# Patient Record
Sex: MALE | Race: BLACK OR AFRICAN AMERICAN | NOT HISPANIC OR LATINO | Employment: OTHER | URBAN - METROPOLITAN AREA
[De-identification: names, ages, dates, MRNs, and addresses within clinical notes are randomized per-mention and may not be internally consistent; named-entity substitution may affect disease eponyms.]

---

## 2024-10-01 ENCOUNTER — APPOINTMENT (OUTPATIENT)
Dept: RADIOLOGY | Facility: HOSPITAL | Age: 69
End: 2024-10-01
Payer: MEDICARE

## 2024-10-01 ENCOUNTER — HOSPITAL ENCOUNTER (OUTPATIENT)
Facility: HOSPITAL | Age: 69
Setting detail: OBSERVATION
LOS: 1 days | Discharge: HOME | End: 2024-10-02
Attending: STUDENT IN AN ORGANIZED HEALTH CARE EDUCATION/TRAINING PROGRAM | Admitting: INTERNAL MEDICINE
Payer: MEDICARE

## 2024-10-01 ENCOUNTER — APPOINTMENT (OUTPATIENT)
Dept: CARDIOLOGY | Facility: HOSPITAL | Age: 69
End: 2024-10-01
Payer: MEDICARE

## 2024-10-01 DIAGNOSIS — R33.8 ACUTE URINARY RETENTION: Primary | ICD-10-CM

## 2024-10-01 LAB
ALBUMIN SERPL BCP-MCNC: 4.3 G/DL (ref 3.4–5)
ALP SERPL-CCNC: 53 U/L (ref 33–136)
ALT SERPL W P-5'-P-CCNC: 11 U/L (ref 10–52)
ANION GAP SERPL CALC-SCNC: 15 MMOL/L (ref 10–20)
APPEARANCE UR: ABNORMAL
AST SERPL W P-5'-P-CCNC: 19 U/L (ref 9–39)
BACTERIA #/AREA URNS AUTO: ABNORMAL /HPF
BASOPHILS # BLD AUTO: 0.04 X10*3/UL (ref 0–0.1)
BASOPHILS NFR BLD AUTO: 0.3 %
BILIRUB SERPL-MCNC: 1 MG/DL (ref 0–1.2)
BILIRUB UR STRIP.AUTO-MCNC: NEGATIVE MG/DL
BUN SERPL-MCNC: 13 MG/DL (ref 6–23)
CALCIUM SERPL-MCNC: 9.1 MG/DL (ref 8.6–10.3)
CHLORIDE SERPL-SCNC: 98 MMOL/L (ref 98–107)
CO2 SERPL-SCNC: 29 MMOL/L (ref 21–32)
COLOR UR: ABNORMAL
CREAT SERPL-MCNC: 1.14 MG/DL (ref 0.5–1.3)
EGFRCR SERPLBLD CKD-EPI 2021: 69 ML/MIN/1.73M*2
EOSINOPHIL # BLD AUTO: 0.01 X10*3/UL (ref 0–0.7)
EOSINOPHIL NFR BLD AUTO: 0.1 %
ERYTHROCYTE [DISTWIDTH] IN BLOOD BY AUTOMATED COUNT: 13.6 % (ref 11.5–14.5)
GLUCOSE BLD MANUAL STRIP-MCNC: 93 MG/DL (ref 74–99)
GLUCOSE BLD MANUAL STRIP-MCNC: 94 MG/DL (ref 74–99)
GLUCOSE SERPL-MCNC: 149 MG/DL (ref 74–99)
GLUCOSE UR STRIP.AUTO-MCNC: NORMAL MG/DL
HCT VFR BLD AUTO: 42.5 % (ref 41–52)
HGB BLD-MCNC: 12.6 G/DL (ref 13.5–17.5)
HOLD SPECIMEN: NORMAL
IMM GRANULOCYTES # BLD AUTO: 0.08 X10*3/UL (ref 0–0.7)
IMM GRANULOCYTES NFR BLD AUTO: 0.5 % (ref 0–0.9)
KETONES UR STRIP.AUTO-MCNC: ABNORMAL MG/DL
LEUKOCYTE ESTERASE UR QL STRIP.AUTO: ABNORMAL
LIPASE SERPL-CCNC: 6 U/L (ref 9–82)
LYMPHOCYTES # BLD AUTO: 0.96 X10*3/UL (ref 1.2–4.8)
LYMPHOCYTES NFR BLD AUTO: 6.4 %
MCH RBC QN AUTO: 26 PG (ref 26–34)
MCHC RBC AUTO-ENTMCNC: 29.6 G/DL (ref 32–36)
MCV RBC AUTO: 88 FL (ref 80–100)
MONOCYTES # BLD AUTO: 0.75 X10*3/UL (ref 0.1–1)
MONOCYTES NFR BLD AUTO: 5 %
NEUTROPHILS # BLD AUTO: 13.1 X10*3/UL (ref 1.2–7.7)
NEUTROPHILS NFR BLD AUTO: 87.7 %
NITRITE UR QL STRIP.AUTO: NEGATIVE
NRBC BLD-RTO: 0 /100 WBCS (ref 0–0)
PH UR STRIP.AUTO: 7 [PH]
PLATELET # BLD AUTO: 221 X10*3/UL (ref 150–450)
POTASSIUM SERPL-SCNC: 3.8 MMOL/L (ref 3.5–5.3)
PROT SERPL-MCNC: 6.8 G/DL (ref 6.4–8.2)
PROT UR STRIP.AUTO-MCNC: ABNORMAL MG/DL
RBC # BLD AUTO: 4.84 X10*6/UL (ref 4.5–5.9)
RBC # UR STRIP.AUTO: ABNORMAL /UL
RBC #/AREA URNS AUTO: >20 /HPF
SODIUM SERPL-SCNC: 138 MMOL/L (ref 136–145)
SP GR UR STRIP.AUTO: 1.02
UROBILINOGEN UR STRIP.AUTO-MCNC: NORMAL MG/DL
WBC # BLD AUTO: 14.9 X10*3/UL (ref 4.4–11.3)
WBC #/AREA URNS AUTO: >50 /HPF
YEAST BUDDING #/AREA UR COMP ASSIST: PRESENT /HPF

## 2024-10-01 PROCEDURE — 96375 TX/PRO/DX INJ NEW DRUG ADDON: CPT | Mod: 59

## 2024-10-01 PROCEDURE — 2500000004 HC RX 250 GENERAL PHARMACY W/ HCPCS (ALT 636 FOR OP/ED)

## 2024-10-01 PROCEDURE — 80053 COMPREHEN METABOLIC PANEL: CPT

## 2024-10-01 PROCEDURE — 2500000001 HC RX 250 WO HCPCS SELF ADMINISTERED DRUGS (ALT 637 FOR MEDICARE OP): Performed by: INTERNAL MEDICINE

## 2024-10-01 PROCEDURE — 2500000002 HC RX 250 W HCPCS SELF ADMINISTERED DRUGS (ALT 637 FOR MEDICARE OP, ALT 636 FOR OP/ED): Performed by: NURSE PRACTITIONER

## 2024-10-01 PROCEDURE — 94640 AIRWAY INHALATION TREATMENT: CPT

## 2024-10-01 PROCEDURE — 51702 INSERT TEMP BLADDER CATH: CPT

## 2024-10-01 PROCEDURE — 2550000001 HC RX 255 CONTRASTS: Performed by: STUDENT IN AN ORGANIZED HEALTH CARE EDUCATION/TRAINING PROGRAM

## 2024-10-01 PROCEDURE — 2500000005 HC RX 250 GENERAL PHARMACY W/O HCPCS: Performed by: INTERNAL MEDICINE

## 2024-10-01 PROCEDURE — 82947 ASSAY GLUCOSE BLOOD QUANT: CPT

## 2024-10-01 PROCEDURE — 93005 ELECTROCARDIOGRAM TRACING: CPT | Mod: 59

## 2024-10-01 PROCEDURE — 36415 COLL VENOUS BLD VENIPUNCTURE: CPT

## 2024-10-01 PROCEDURE — 85025 COMPLETE CBC W/AUTO DIFF WBC: CPT

## 2024-10-01 PROCEDURE — 83690 ASSAY OF LIPASE: CPT | Performed by: STUDENT IN AN ORGANIZED HEALTH CARE EDUCATION/TRAINING PROGRAM

## 2024-10-01 PROCEDURE — 87086 URINE CULTURE/COLONY COUNT: CPT | Mod: STJLAB

## 2024-10-01 PROCEDURE — 96365 THER/PROPH/DIAG IV INF INIT: CPT | Mod: 59

## 2024-10-01 PROCEDURE — 2500000004 HC RX 250 GENERAL PHARMACY W/ HCPCS (ALT 636 FOR OP/ED): Performed by: INTERNAL MEDICINE

## 2024-10-01 PROCEDURE — 74177 CT ABD & PELVIS W/CONTRAST: CPT

## 2024-10-01 PROCEDURE — 2500000005 HC RX 250 GENERAL PHARMACY W/O HCPCS

## 2024-10-01 PROCEDURE — 99285 EMERGENCY DEPT VISIT HI MDM: CPT | Mod: 25

## 2024-10-01 PROCEDURE — 2500000004 HC RX 250 GENERAL PHARMACY W/ HCPCS (ALT 636 FOR OP/ED): Performed by: STUDENT IN AN ORGANIZED HEALTH CARE EDUCATION/TRAINING PROGRAM

## 2024-10-01 PROCEDURE — 1200000002 HC GENERAL ROOM WITH TELEMETRY DAILY

## 2024-10-01 PROCEDURE — 74177 CT ABD & PELVIS W/CONTRAST: CPT | Performed by: RADIOLOGY

## 2024-10-01 PROCEDURE — 2500000002 HC RX 250 W HCPCS SELF ADMINISTERED DRUGS (ALT 637 FOR MEDICARE OP, ALT 636 FOR OP/ED): Performed by: INTERNAL MEDICINE

## 2024-10-01 PROCEDURE — 51798 US URINE CAPACITY MEASURE: CPT

## 2024-10-01 PROCEDURE — 81001 URINALYSIS AUTO W/SCOPE: CPT

## 2024-10-01 PROCEDURE — 99223 1ST HOSP IP/OBS HIGH 75: CPT | Performed by: NURSE PRACTITIONER

## 2024-10-01 RX ORDER — ONDANSETRON 4 MG/1
4 TABLET, FILM COATED ORAL EVERY 8 HOURS PRN
Status: DISCONTINUED | OUTPATIENT
Start: 2024-10-01 | End: 2024-10-02 | Stop reason: HOSPADM

## 2024-10-01 RX ORDER — ACETAMINOPHEN 160 MG/5ML
650 SOLUTION ORAL EVERY 6 HOURS PRN
Status: DISCONTINUED | OUTPATIENT
Start: 2024-10-01 | End: 2024-10-02 | Stop reason: HOSPADM

## 2024-10-01 RX ORDER — ENOXAPARIN SODIUM 100 MG/ML
40 INJECTION SUBCUTANEOUS EVERY 24 HOURS
Status: DISCONTINUED | OUTPATIENT
Start: 2024-10-01 | End: 2024-10-02 | Stop reason: HOSPADM

## 2024-10-01 RX ORDER — POLYETHYLENE GLYCOL 3350 17 G/17G
17 POWDER, FOR SOLUTION ORAL DAILY PRN
Status: DISCONTINUED | OUTPATIENT
Start: 2024-10-01 | End: 2024-10-02 | Stop reason: HOSPADM

## 2024-10-01 RX ORDER — BUSPIRONE HYDROCHLORIDE 10 MG/1
10 TABLET ORAL 2 TIMES DAILY
Status: DISCONTINUED | OUTPATIENT
Start: 2024-10-01 | End: 2024-10-02 | Stop reason: HOSPADM

## 2024-10-01 RX ORDER — ATORVASTATIN CALCIUM 80 MG/1
80 TABLET, FILM COATED ORAL DAILY
COMMUNITY

## 2024-10-01 RX ORDER — CETIRIZINE HYDROCHLORIDE 10 MG/1
10 TABLET ORAL DAILY
COMMUNITY

## 2024-10-01 RX ORDER — ALBUTEROL SULFATE 0.83 MG/ML
2.5 SOLUTION RESPIRATORY (INHALATION) EVERY 6 HOURS PRN
Status: DISCONTINUED | OUTPATIENT
Start: 2024-10-01 | End: 2024-10-02 | Stop reason: HOSPADM

## 2024-10-01 RX ORDER — PANTOPRAZOLE SODIUM 40 MG/1
40 TABLET, DELAYED RELEASE ORAL
Status: DISCONTINUED | OUTPATIENT
Start: 2024-10-02 | End: 2024-10-02 | Stop reason: HOSPADM

## 2024-10-01 RX ORDER — GUAIFENESIN 600 MG/1
600 TABLET, EXTENDED RELEASE ORAL EVERY 12 HOURS PRN
Status: DISCONTINUED | OUTPATIENT
Start: 2024-10-01 | End: 2024-10-02 | Stop reason: HOSPADM

## 2024-10-01 RX ORDER — MORPHINE SULFATE 4 MG/ML
4 INJECTION, SOLUTION INTRAMUSCULAR; INTRAVENOUS ONCE
Status: COMPLETED | OUTPATIENT
Start: 2024-10-01 | End: 2024-10-01

## 2024-10-01 RX ORDER — SODIUM CHLORIDE 9 MG/ML
100 INJECTION, SOLUTION INTRAVENOUS CONTINUOUS
Status: ACTIVE | OUTPATIENT
Start: 2024-10-01 | End: 2024-10-02

## 2024-10-01 RX ORDER — BUDESONIDE, GLYCOPYRROLATE, AND FORMOTEROL FUMARATE 160; 9; 4.8 UG/1; UG/1; UG/1
2 AEROSOL, METERED RESPIRATORY (INHALATION) 2 TIMES DAILY
COMMUNITY

## 2024-10-01 RX ORDER — DEXTROSE 50 % IN WATER (D50W) INTRAVENOUS SYRINGE
12.5
Status: DISCONTINUED | OUTPATIENT
Start: 2024-10-01 | End: 2024-10-02 | Stop reason: HOSPADM

## 2024-10-01 RX ORDER — INSULIN LISPRO 100 [IU]/ML
0-5 INJECTION, SOLUTION INTRAVENOUS; SUBCUTANEOUS
Status: DISCONTINUED | OUTPATIENT
Start: 2024-10-01 | End: 2024-10-02 | Stop reason: HOSPADM

## 2024-10-01 RX ORDER — CETIRIZINE HYDROCHLORIDE 10 MG/1
10 TABLET ORAL DAILY
Status: DISCONTINUED | OUTPATIENT
Start: 2024-10-01 | End: 2024-10-02 | Stop reason: HOSPADM

## 2024-10-01 RX ORDER — DEXTROSE 50 % IN WATER (D50W) INTRAVENOUS SYRINGE
25
Status: DISCONTINUED | OUTPATIENT
Start: 2024-10-01 | End: 2024-10-02 | Stop reason: HOSPADM

## 2024-10-01 RX ORDER — FLUTICASONE PROPIONATE 50 MCG
2 SPRAY, SUSPENSION (ML) NASAL DAILY
Status: DISCONTINUED | OUTPATIENT
Start: 2024-10-01 | End: 2024-10-02 | Stop reason: HOSPADM

## 2024-10-01 RX ORDER — DIPHENHYDRAMINE HCL 25 MG
25 TABLET ORAL DAILY
Status: DISCONTINUED | OUTPATIENT
Start: 2024-10-01 | End: 2024-10-02 | Stop reason: HOSPADM

## 2024-10-01 RX ORDER — ASPIRIN 81 MG/1
81 TABLET ORAL DAILY
Status: DISCONTINUED | OUTPATIENT
Start: 2024-10-01 | End: 2024-10-02 | Stop reason: HOSPADM

## 2024-10-01 RX ORDER — BUSPIRONE HYDROCHLORIDE 10 MG/1
10 TABLET ORAL 2 TIMES DAILY
COMMUNITY

## 2024-10-01 RX ORDER — FLUTICASONE PROPIONATE 50 MCG
2 SPRAY, SUSPENSION (ML) NASAL DAILY
COMMUNITY

## 2024-10-01 RX ORDER — ONDANSETRON HYDROCHLORIDE 2 MG/ML
4 INJECTION, SOLUTION INTRAVENOUS EVERY 8 HOURS PRN
Status: DISCONTINUED | OUTPATIENT
Start: 2024-10-01 | End: 2024-10-02 | Stop reason: HOSPADM

## 2024-10-01 RX ORDER — ALBUTEROL SULFATE 90 UG/1
2 INHALANT RESPIRATORY (INHALATION) EVERY 6 HOURS PRN
COMMUNITY

## 2024-10-01 RX ORDER — ATORVASTATIN CALCIUM 80 MG/1
80 TABLET, FILM COATED ORAL NIGHTLY
Status: DISCONTINUED | OUTPATIENT
Start: 2024-10-01 | End: 2024-10-02 | Stop reason: HOSPADM

## 2024-10-01 RX ORDER — ACETAMINOPHEN 325 MG/1
650 TABLET ORAL EVERY 6 HOURS PRN
Status: DISCONTINUED | OUTPATIENT
Start: 2024-10-01 | End: 2024-10-02 | Stop reason: HOSPADM

## 2024-10-01 RX ORDER — ONDANSETRON HYDROCHLORIDE 2 MG/ML
4 INJECTION, SOLUTION INTRAVENOUS ONCE
Status: COMPLETED | OUTPATIENT
Start: 2024-10-01 | End: 2024-10-01

## 2024-10-01 RX ORDER — OXYBUTYNIN CHLORIDE 5 MG/1
5 TABLET ORAL 2 TIMES DAILY
Status: DISCONTINUED | OUTPATIENT
Start: 2024-10-01 | End: 2024-10-02

## 2024-10-01 RX ORDER — IPRATROPIUM BROMIDE AND ALBUTEROL SULFATE 2.5; .5 MG/3ML; MG/3ML
3 SOLUTION RESPIRATORY (INHALATION)
Status: DISCONTINUED | OUTPATIENT
Start: 2024-10-01 | End: 2024-10-02

## 2024-10-01 RX ORDER — DIPHENHYDRAMINE HCL 25 MG
25 TABLET ORAL DAILY
COMMUNITY

## 2024-10-01 RX ORDER — CEFTRIAXONE 1 G/50ML
1 INJECTION, SOLUTION INTRAVENOUS ONCE
Status: COMPLETED | OUTPATIENT
Start: 2024-10-01 | End: 2024-10-01

## 2024-10-01 RX ORDER — ALBUTEROL SULFATE 90 UG/1
2 INHALANT RESPIRATORY (INHALATION) EVERY 6 HOURS PRN
Status: DISCONTINUED | OUTPATIENT
Start: 2024-10-01 | End: 2024-10-01

## 2024-10-01 RX ORDER — ACETAMINOPHEN 650 MG/1
650 SUPPOSITORY RECTAL EVERY 6 HOURS PRN
Status: DISCONTINUED | OUTPATIENT
Start: 2024-10-01 | End: 2024-10-02 | Stop reason: HOSPADM

## 2024-10-01 RX ORDER — PROMETHAZINE HYDROCHLORIDE 25 MG/1
25 TABLET ORAL EVERY 6 HOURS PRN
Status: DISCONTINUED | OUTPATIENT
Start: 2024-10-01 | End: 2024-10-02 | Stop reason: HOSPADM

## 2024-10-01 RX ORDER — TOLTERODINE 2 MG/1
2 CAPSULE, EXTENDED RELEASE ORAL DAILY
COMMUNITY
End: 2024-10-02 | Stop reason: HOSPADM

## 2024-10-01 RX ORDER — CEFTRIAXONE 2 G/50ML
2 INJECTION, SOLUTION INTRAVENOUS EVERY 24 HOURS
Status: DISCONTINUED | OUTPATIENT
Start: 2024-10-02 | End: 2024-10-02 | Stop reason: HOSPADM

## 2024-10-01 RX ORDER — ALBUTEROL SULFATE 0.83 MG/ML
2.5 SOLUTION RESPIRATORY (INHALATION) EVERY 6 HOURS PRN
COMMUNITY

## 2024-10-01 RX ORDER — PROMETHAZINE HYDROCHLORIDE 25 MG/1
25 SUPPOSITORY RECTAL EVERY 12 HOURS PRN
Status: DISCONTINUED | OUTPATIENT
Start: 2024-10-01 | End: 2024-10-02 | Stop reason: HOSPADM

## 2024-10-01 RX ORDER — OMEPRAZOLE 20 MG/1
20 TABLET, DELAYED RELEASE ORAL 2 TIMES DAILY
COMMUNITY

## 2024-10-01 RX ORDER — ASPIRIN 81 MG/1
81 TABLET ORAL DAILY
COMMUNITY

## 2024-10-01 SDOH — SOCIAL STABILITY: SOCIAL INSECURITY: WERE YOU ABLE TO COMPLETE ALL THE BEHAVIORAL HEALTH SCREENINGS?: YES

## 2024-10-01 SDOH — SOCIAL STABILITY: SOCIAL INSECURITY: HAS ANYONE EVER THREATENED TO HURT YOUR FAMILY OR YOUR PETS?: NO

## 2024-10-01 SDOH — SOCIAL STABILITY: SOCIAL INSECURITY
WITHIN THE LAST YEAR, HAVE TO BEEN RAPED OR FORCED TO HAVE ANY KIND OF SEXUAL ACTIVITY BY YOUR PARTNER OR EX-PARTNER?: NO

## 2024-10-01 SDOH — SOCIAL STABILITY: SOCIAL INSECURITY: ABUSE: ADULT

## 2024-10-01 SDOH — ECONOMIC STABILITY: FOOD INSECURITY: WITHIN THE PAST 12 MONTHS, YOU WORRIED THAT YOUR FOOD WOULD RUN OUT BEFORE YOU GOT MONEY TO BUY MORE.: PATIENT DECLINED

## 2024-10-01 SDOH — SOCIAL STABILITY: SOCIAL INSECURITY: WITHIN THE LAST YEAR, HAVE YOU BEEN AFRAID OF YOUR PARTNER OR EX-PARTNER?: NO

## 2024-10-01 SDOH — SOCIAL STABILITY: SOCIAL INSECURITY: DOES ANYONE TRY TO KEEP YOU FROM HAVING/CONTACTING OTHER FRIENDS OR DOING THINGS OUTSIDE YOUR HOME?: NO

## 2024-10-01 SDOH — SOCIAL STABILITY: SOCIAL INSECURITY
WITHIN THE LAST YEAR, HAVE YOU BEEN KICKED, HIT, SLAPPED, OR OTHERWISE PHYSICALLY HURT BY YOUR PARTNER OR EX-PARTNER?: NO

## 2024-10-01 SDOH — SOCIAL STABILITY: SOCIAL INSECURITY: WITHIN THE LAST YEAR, HAVE YOU BEEN HUMILIATED OR EMOTIONALLY ABUSED IN OTHER WAYS BY YOUR PARTNER OR EX-PARTNER?: NO

## 2024-10-01 SDOH — SOCIAL STABILITY: SOCIAL INSECURITY: HAVE YOU HAD ANY THOUGHTS OF HARMING ANYONE ELSE?: NO

## 2024-10-01 SDOH — SOCIAL STABILITY: SOCIAL INSECURITY: ARE THERE ANY APPARENT SIGNS OF INJURIES/BEHAVIORS THAT COULD BE RELATED TO ABUSE/NEGLECT?: NO

## 2024-10-01 SDOH — SOCIAL STABILITY: SOCIAL INSECURITY: DO YOU FEEL ANYONE HAS EXPLOITED OR TAKEN ADVANTAGE OF YOU FINANCIALLY OR OF YOUR PERSONAL PROPERTY?: NO

## 2024-10-01 SDOH — SOCIAL STABILITY: SOCIAL INSECURITY: ARE YOU OR HAVE YOU BEEN THREATENED OR ABUSED PHYSICALLY, EMOTIONALLY, OR SEXUALLY BY ANYONE?: NO

## 2024-10-01 SDOH — SOCIAL STABILITY: SOCIAL INSECURITY: DO YOU FEEL UNSAFE GOING BACK TO THE PLACE WHERE YOU ARE LIVING?: NO

## 2024-10-01 SDOH — ECONOMIC STABILITY: INCOME INSECURITY
IN THE PAST 12 MONTHS, HAS THE ELECTRIC, GAS, OIL, OR WATER COMPANY THREATENED TO SHUT OFF SERVICE IN YOUR HOME?: PATIENT DECLINED

## 2024-10-01 SDOH — SOCIAL STABILITY: SOCIAL INSECURITY: HAVE YOU HAD THOUGHTS OF HARMING ANYONE ELSE?: NO

## 2024-10-01 SDOH — ECONOMIC STABILITY: FOOD INSECURITY: WITHIN THE PAST 12 MONTHS, THE FOOD YOU BOUGHT JUST DIDN'T LAST AND YOU DIDN'T HAVE MONEY TO GET MORE.: PATIENT DECLINED

## 2024-10-01 ASSESSMENT — ACTIVITIES OF DAILY LIVING (ADL)
ADEQUATE_TO_COMPLETE_ADL: YES
TOILETING: NEEDS ASSISTANCE
JUDGMENT_ADEQUATE_SAFELY_COMPLETE_DAILY_ACTIVITIES: YES
DRESSING YOURSELF: NEEDS ASSISTANCE
LACK_OF_TRANSPORTATION: PATIENT DECLINED
HEARING - RIGHT EAR: FUNCTIONAL
BATHING: NEEDS ASSISTANCE
HEARING - LEFT EAR: FUNCTIONAL
GROOMING: NEEDS ASSISTANCE
WALKS IN HOME: NEEDS ASSISTANCE
LACK_OF_TRANSPORTATION: NO
PATIENT'S MEMORY ADEQUATE TO SAFELY COMPLETE DAILY ACTIVITIES?: YES
FEEDING YOURSELF: NEEDS ASSISTANCE

## 2024-10-01 ASSESSMENT — COGNITIVE AND FUNCTIONAL STATUS - GENERAL
DRESSING REGULAR UPPER BODY CLOTHING: A LITTLE
DRESSING REGULAR LOWER BODY CLOTHING: A LITTLE
MOBILITY SCORE: 20
STANDING UP FROM CHAIR USING ARMS: A LITTLE
MOVING TO AND FROM BED TO CHAIR: A LITTLE
DRESSING REGULAR LOWER BODY CLOTHING: A LITTLE
MOBILITY SCORE: 20
CLIMB 3 TO 5 STEPS WITH RAILING: A LITTLE
PATIENT BASELINE BEDBOUND: NO
HELP NEEDED FOR BATHING: A LITTLE
CLIMB 3 TO 5 STEPS WITH RAILING: A LITTLE
STANDING UP FROM CHAIR USING ARMS: A LITTLE
DRESSING REGULAR UPPER BODY CLOTHING: A LITTLE
HELP NEEDED FOR BATHING: A LITTLE
DAILY ACTIVITIY SCORE: 21
WALKING IN HOSPITAL ROOM: A LITTLE
DAILY ACTIVITIY SCORE: 21
WALKING IN HOSPITAL ROOM: A LITTLE
MOVING TO AND FROM BED TO CHAIR: A LITTLE

## 2024-10-01 ASSESSMENT — COLUMBIA-SUICIDE SEVERITY RATING SCALE - C-SSRS
6. HAVE YOU EVER DONE ANYTHING, STARTED TO DO ANYTHING, OR PREPARED TO DO ANYTHING TO END YOUR LIFE?: NO
2. HAVE YOU ACTUALLY HAD ANY THOUGHTS OF KILLING YOURSELF?: NO
1. IN THE PAST MONTH, HAVE YOU WISHED YOU WERE DEAD OR WISHED YOU COULD GO TO SLEEP AND NOT WAKE UP?: NO

## 2024-10-01 ASSESSMENT — PAIN SCALES - GENERAL
PAINLEVEL_OUTOF10: 2
PAINLEVEL_OUTOF10: 0 - NO PAIN
PAINLEVEL_OUTOF10: 0 - NO PAIN
PAINLEVEL_OUTOF10: 7
PAINLEVEL_OUTOF10: 6

## 2024-10-01 ASSESSMENT — PAIN - FUNCTIONAL ASSESSMENT
PAIN_FUNCTIONAL_ASSESSMENT: 0-10

## 2024-10-01 ASSESSMENT — LIFESTYLE VARIABLES
EVER HAD A DRINK FIRST THING IN THE MORNING TO STEADY YOUR NERVES TO GET RID OF A HANGOVER: NO
HAVE YOU EVER FELT YOU SHOULD CUT DOWN ON YOUR DRINKING: NO
SKIP TO QUESTIONS 9-10: 1
HAVE PEOPLE ANNOYED YOU BY CRITICIZING YOUR DRINKING: NO
HOW MANY STANDARD DRINKS CONTAINING ALCOHOL DO YOU HAVE ON A TYPICAL DAY: PATIENT DOES NOT DRINK
HOW OFTEN DO YOU HAVE A DRINK CONTAINING ALCOHOL: NEVER
AUDIT-C TOTAL SCORE: 0
TOTAL SCORE: 0
EVER FELT BAD OR GUILTY ABOUT YOUR DRINKING: NO
HOW OFTEN DO YOU HAVE 6 OR MORE DRINKS ON ONE OCCASION: NEVER
AUDIT-C TOTAL SCORE: 0

## 2024-10-01 ASSESSMENT — PATIENT HEALTH QUESTIONNAIRE - PHQ9
1. LITTLE INTEREST OR PLEASURE IN DOING THINGS: NOT AT ALL
2. FEELING DOWN, DEPRESSED OR HOPELESS: NOT AT ALL
SUM OF ALL RESPONSES TO PHQ9 QUESTIONS 1 & 2: 0

## 2024-10-01 ASSESSMENT — PAIN DESCRIPTION - ORIENTATION: ORIENTATION: LEFT

## 2024-10-01 ASSESSMENT — PAIN DESCRIPTION - LOCATION: LOCATION: ABDOMEN

## 2024-10-01 ASSESSMENT — PAIN DESCRIPTION - PAIN TYPE: TYPE: ACUTE PAIN

## 2024-10-01 NOTE — ED PROVIDER NOTES
"EMERGENCY DEPARTMENT ENCOUNTER      Pt Name: Steve Palm  MRN: [id number]  Birthdate 1955  Date of evaluation: 10/1/2024  Provider: Cesar Kearns DO    CHIEF COMPLAINT       Chief Complaint   Patient presents with    Flank Pain     Hx of recurrent UTIs         HISTORY OF PRESENT ILLNESS    HPI    69-year-old male with past medical history significant for COPD with chronic respiratory failure on trach mask 3 L/min at baseline, diabetes mellitus 2, mixed hyperlipidemia presenting to the emergency department for evaluation of urinary symptoms.  Patient states he has a history of recurrent urinary tract infections and thinks he has one as his symptoms are similar to prior urinary tract infections.  States over the past couple days he has had suprapubic abdominal pain that started to radiate into his left flank as well as dysuria, urgency, decreased urinary frequency.  States she feels like he needs to use the restroom however only \"dribbles\" when he urinates.  Denies fevers, chills, night sweats, nausea, vomiting, diarrhea, black or bloody stools.    Nursing Notes were reviewed.    PAST MEDICAL HISTORY   History reviewed. No pertinent past medical history.      SURGICAL HISTORY     History reviewed. No pertinent surgical history.      CURRENT MEDICATIONS       Current Discharge Medication List        CONTINUE these medications which have NOT CHANGED    Details   albuterol (Proventil HFA) 90 mcg/actuation inhaler Inhale 2 puffs every 6 hours if needed for wheezing.      aspirin 81 mg EC tablet Take 1 tablet (81 mg) by mouth once daily.      atorvastatin (Lipitor) 80 mg tablet Take 1 tablet (80 mg) by mouth once daily.      budesonide-glycopyr-formoterol (Breztri Aerosphere) 160-9-4.8 mcg/actuation HFA aerosol inhaler Inhale 2 puffs 2 times a day.      busPIRone (Buspar) 10 mg tablet Take 1 tablet (10 mg) by mouth 2 times a day.      cetirizine (ZyrTEC) 10 mg tablet Take 1 tablet (10 mg) by mouth once daily. "      diphenhydrAMINE (Sominex) 25 mg tablet Take 1 tablet (25 mg) by mouth once daily.      fluticasone (Flonase) 50 mcg/actuation nasal spray Administer 2 sprays into each nostril once daily. Shake gently. Before first use, prime pump. After use, clean tip and replace cap.      omeprazole OTC (PriLOSEC OTC) 20 mg EC tablet Take 1 tablet (20 mg) by mouth 2 times a day. Do not crush, chew, or split.      tolterodine LA (Detrol LA) 2 mg 24 hr capsule Take 1 capsule (2 mg) by mouth once daily. Do not crush, chew, or split.      albuterol 2.5 mg /3 mL (0.083 %) nebulizer solution Take 3 mL (2.5 mg) by nebulization every 6 hours if needed for wheezing.             ALLERGIES     Patient has no known allergies.    FAMILY HISTORY     No family history on file.       SOCIAL HISTORY       Social History     Socioeconomic History    Marital status:    Tobacco Use    Smoking status: Former     Types: Cigarettes    Smokeless tobacco: Never   Vaping Use    Vaping status: Never Used   Substance and Sexual Activity    Alcohol use: Not Currently    Drug use: Not Currently    Sexual activity: Defer     Social Determinants of Health     Financial Resource Strain: Patient Declined (10/1/2024)    Overall Financial Resource Strain (CARDIA)     Difficulty of Paying Living Expenses: Patient declined   Food Insecurity: Patient Declined (10/1/2024)    Hunger Vital Sign     Worried About Running Out of Food in the Last Year: Patient declined     Ran Out of Food in the Last Year: Patient declined   Transportation Needs: No Transportation Needs (10/1/2024)    PRAPARE - Transportation     Lack of Transportation (Medical): No     Lack of Transportation (Non-Medical): No   Intimate Partner Violence: Not At Risk (10/1/2024)    Humiliation, Afraid, Rape, and Kick questionnaire     Fear of Current or Ex-Partner: No     Emotionally Abused: No     Physically Abused: No     Sexually Abused: No   Housing Stability: Unknown (10/1/2024)    Housing  Stability Vital Sign     Unable to Pay for Housing in the Last Year: Patient declined     Number of Times Moved in the Last Year: 1     Homeless in the Last Year: No       SCREENINGS                        PHYSICAL EXAM    (up to 7 for level 4, 8 or more for level 5)     ED Triage Vitals [10/01/24 0734]   Temperature Heart Rate Respirations BP   36.6 °C (97.9 °F) 69 18 (!) 190/108      Pulse Ox Temp Source Heart Rate Source Patient Position   99 % Temporal Monitor Sitting      BP Location FiO2 (%)     Right arm --       Physical Exam  Vitals and nursing note reviewed.   Constitutional:       General: He is not in acute distress.     Appearance: Normal appearance. He is not ill-appearing, toxic-appearing or diaphoretic.   HENT:      Head: Normocephalic and atraumatic.      Right Ear: External ear normal.      Left Ear: External ear normal.      Nose: Nose normal.      Mouth/Throat:      Comments: Trach collar in place  Eyes:      Conjunctiva/sclera: Conjunctivae normal.   Cardiovascular:      Rate and Rhythm: Normal rate and regular rhythm.      Pulses: Normal pulses.      Heart sounds: Normal heart sounds.   Pulmonary:      Effort: Pulmonary effort is normal.      Breath sounds: Normal breath sounds.   Abdominal:      General: Abdomen is flat.      Palpations: Abdomen is soft.      Comments: Left lower quadrant and suprapubic tenderness with left-sided CVA tenderness.  No rebound or guarding.   Musculoskeletal:         General: No swelling, tenderness, deformity or signs of injury. Normal range of motion.      Cervical back: Normal range of motion and neck supple. No rigidity or tenderness.      Right lower leg: No edema.      Left lower leg: No edema.   Lymphadenopathy:      Cervical: No cervical adenopathy.   Skin:     General: Skin is warm and dry.   Neurological:      General: No focal deficit present.      Mental Status: He is alert and oriented to person, place, and time.   Psychiatric:         Mood and  Affect: Mood normal.         Behavior: Behavior normal.          DIAGNOSTIC RESULTS     LABS:  Labs Reviewed   CBC WITH AUTO DIFFERENTIAL - Abnormal       Result Value    WBC 14.9 (*)     nRBC 0.0      RBC 4.84      Hemoglobin 12.6 (*)     Hematocrit 42.5      MCV 88      MCH 26.0      MCHC 29.6 (*)     RDW 13.6      Platelets 221      Neutrophils % 87.7      Immature Granulocytes %, Automated 0.5      Lymphocytes % 6.4      Monocytes % 5.0      Eosinophils % 0.1      Basophils % 0.3      Neutrophils Absolute 13.10 (*)     Immature Granulocytes Absolute, Automated 0.08      Lymphocytes Absolute 0.96 (*)     Monocytes Absolute 0.75      Eosinophils Absolute 0.01      Basophils Absolute 0.04     COMPREHENSIVE METABOLIC PANEL - Abnormal    Glucose 149 (*)     Sodium 138      Potassium 3.8      Chloride 98      Bicarbonate 29      Anion Gap 15      Urea Nitrogen 13      Creatinine 1.14      eGFR 69      Calcium 9.1      Albumin 4.3      Alkaline Phosphatase 53      Total Protein 6.8      AST 19      Bilirubin, Total 1.0      ALT 11     URINALYSIS WITH REFLEX CULTURE AND MICROSCOPIC - Abnormal    Color, Urine Light-Yellow      Appearance, Urine Turbid (*)     Specific Gravity, Urine 1.018      pH, Urine 7.0      Protein, Urine 20 (TRACE)      Glucose, Urine Normal      Blood, Urine 1.0 (3+) (*)     Ketones, Urine 10 (1+) (*)     Bilirubin, Urine NEGATIVE      Urobilinogen, Urine Normal      Nitrite, Urine NEGATIVE      Leukocyte Esterase, Urine 500 Van/µL (*)    MICROSCOPIC ONLY, URINE - Abnormal    WBC, Urine >50 (*)     RBC, Urine >20 (*)     Bacteria, Urine 1+ (*)     Budding Yeast, Urine PRESENT (*)    LIPASE - Abnormal    Lipase 6 (*)     Narrative:     Venipuncture immediately after or during the administration of Metamizole may lead to falsely low results. Testing should be performed immediately prior to Metamizole dosing.   URINE CULTURE   URINALYSIS WITH REFLEX CULTURE AND MICROSCOPIC    Narrative:     The  following orders were created for panel order Urinalysis with Reflex Culture and Microscopic.  Procedure                               Abnormality         Status                     ---------                               -----------         ------                     Urinalysis with Reflex C...[[id number]]  Abnormal            Final result               Extra Urine Gray Tube[[id number]]                            In process                   Please view results for these tests on the individual orders.   EXTRA URINE GRAY TUBE   POCT GLUCOSE METER       All other labs were within normal range or not returned as of this dictation.    Imaging  CT abdomen pelvis w IV contrast   Final Result   1. Mild bilateral hydronephrosis and hydroureter to the level of the   ascending urinary bladder. The dilatation is likely from bladder   distention. The bladder volume is 911 cc.   2. There is haziness in the fat around the left kidney and left renal   pelvis and proximal left ureter. This could represent pyelo calyceal   rupture from obstruction versus pyelonephritis. 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   3. Benign renal cysts.   4. Gallbladder distention with intrahepatic, common hepatic and   common bile ductal dilatation. There is pancreatic dilatation. The   etiology of this is not known. Consider distal pancreatic/duodenal   stricture.   5. Constipation.   6. Status post esophagectomy.   7. Click 5 cm left lower lobe nodule.        MACRO:   None        Signed by: Joan Corey 10/1/2024 11:37 AM   Dictation workstation:   [id number]           Procedures  Procedures     EMERGENCY DEPARTMENT COURSE/MDM:     Diagnoses as of 10/01/24 1858   Acute urinary retention        Medical Decision Making    69-year-old male presenting to the emergency department for evaluation of urinary symptoms.  Patient states he has a  history of recurrent urinary tract infections and thinks he has one as his symptoms are similar to prior urinary tract infections.  Arrival patient was moderately hypertensive possibly due to pain with pressure 190/108.  Vitals otherwise WNL.  History and physical exam consistent with urinary tract infection with possible pyelonephritis.  CBC, CMP, lipase, urinalysis and CT abdomen pelvis with IV contrast ordered.  4 mg of Zofran and 4 mg of morphine ordered for pain.    EKG: Sinus rhythm with PACs with a ventricular rate of 80 bpm, CO interval 54 ms, QRS 84 ms, QTc 514 ms.  No evidence of acute ST changes noted.    Labs significant for leukocytosis at 14.9 with 1+ bacteria with microscopic hematuria and pyuria with positive LE. CT abdomen pelvis with IV contrast CT scan shows acute urinary retention with 911 cc estimated urine, mild bilateral hydronephrosis and hydroureter with haziness in the fat around the left kidney and right renal pelvis and proximal left ureter suggestive of pyelonephritis versus Martinez calyceal rupture.  There is also gallbladder distention with intrahepatic,, hepatic and, bile duct dilation with pancreatic dilation all of which are likely chronic given normal AST, ALT and bilirubin.  Reed catheter placed by nursing staff and 1 g of Rocephin ordered for empiric treatment of pyelonephritis.  No cultures available per EMR.  There was no urologist on-call at [facility] per [facility] so I reached out [facility] who [facility] and [facility].    [facility] however [facility] are no [facility] available at this time.  Urologist on-call Dr. Martínez was consulted who reviewed patient's imaging and advised patient could have a possible calyceal rupture however more likely pyelonephritis regardless management would be the same.  Patient case discussed with Dr. Siddiqui who agreed with  patient to his service for further evaluation and treatment.     Patient and or family in agreement and understanding of treatment plan.  All questions answered.      I reviewed the case with the attending ED physician. The attending ED physician agrees with the plan. Patient and/or patient´s representative was counseled regarding labs, imaging, likely diagnosis, and plan. All questions were answered.    ED Medications administered this visit:    Medications   oxygen (O2) therapy (3 L/min inhalation Start 10/1/24 0735)   albuterol 2.5 mg /3 mL (0.083 %) nebulizer solution 2.5 mg (has no administration in time range)   aspirin EC tablet 81 mg (has no administration in time range)   atorvastatin (Lipitor) tablet 80 mg (has no administration in time range)   busPIRone (Buspar) tablet 10 mg (has no administration in time range)   cetirizine (ZyrTEC) tablet 10 mg (has no administration in time range)   diphenhydrAMINE (Sominex) tablet 25 mg (has no administration in time range)   fluticasone (Flonase) nasal spray 2 spray (has no administration in time range)   pantoprazole (ProtoNix) EC tablet 40 mg (has no administration in time range)   oxybutynin (Ditropan) tablet 5 mg (has no administration in time range)   enoxaparin (Lovenox) syringe 40 mg (has no administration in time range)   sodium chloride 0.9% infusion (100 mL/hr intravenous Rate/Dose Verify 10/1/24 1836)   acetaminophen (Tylenol) tablet 650 mg (has no administration in time range)     Or   acetaminophen (Tylenol) oral liquid 650 mg (has no administration in time range)     Or   acetaminophen (Tylenol) suppository 650 mg (has no administration in time range)   ondansetron (Zofran) tablet 4 mg (has no administration in time range)     Or   ondansetron (Zofran) injection 4 mg (has no administration in time range)   promethazine (Phenergan) tablet 25 mg (has no administration in time range)     Or   promethazine (Phenergan) suppository 25 mg (has no  administration in time range)   polyethylene glycol (Glycolax, Miralax) packet 17 g (has no administration in time range)   benzocaine-menthol (Cepastat Sore Throat) lozenge 1 lozenge (has no administration in time range)   guaiFENesin (Mucinex) 12 hr tablet 600 mg (has no administration in time range)   cefTRIAXone (Rocephin) 2 g in dextrose (iso) IV 50 mL (has no administration in time range)   ipratropium-albuteroL (Duo-Neb) 0.5-2.5 mg/3 mL nebulizer solution 3 mL (has no administration in time range)   insulin lispro (HumaLOG) injection 0-5 Units (has no administration in time range)   glucagon (Glucagen) injection 1 mg (has no administration in time range)   dextrose 50 % injection 25 g (has no administration in time range)   glucagon (Glucagen) injection 1 mg (has no administration in time range)   dextrose 50 % injection 12.5 g (has no administration in time range)   morphine injection 4 mg (4 mg intravenous Given 10/1/24 0906)   ondansetron (Zofran) injection 4 mg (4 mg intravenous Given 10/1/24 0907)   cefTRIAXone (Rocephin) 1 g in dextrose (iso) IV 50 mL (0 g intravenous Stopped 10/1/24 1011)   iohexol (OMNIPaque) 350 mg iodine/mL solution 70 mL (70 mL intravenous Given 10/1/24 1025)       New Prescriptions from this visit:    Current Discharge Medication List          Follow-up:  No follow-up provider specified.      Final Impression:   1. Acute urinary retention          (Please note that portions of this note were completed with a voice recognition program.  Efforts were made to edit the dictations but occasionally words are mis-transcribed.)     Cesar Kearns,   Resident  10/01/24 2915

## 2024-10-01 NOTE — H&P
History Of Present Illness  This is a 69-year-old male who [facility] who is presenting to the emergency department with multiple urinary symptoms.  Patient over the last 2 days says that he has been having suprapubic abdominal pain which radiates towards his left flank accompanied by dysuria, urinary urgency and frequency.  He denies any fevers, chills, nausea, emesis or diarrhea.  No lightheadedness, dizziness or syncope.  No chest pain or shortness of breath.  He has a history of frequent UTIs.  Indwelling urinary catheter was placed in the ER and ER provider did speak with  urology [facility] with [facility].  [facility].  Transfer started in the ED.    Review of systems: 10 system were reviewed and were negative except what was mentioned in history of present illness    ED course: Vital signs show patient blood pressure at 149/92.  Heart rate at 75 bpm.  Patient oxygen saturations at 99% on supplemental O2 which is chronic in nature.  CMP shows a blood glucose of 149.  Sodium at 138 with potassium of 3.8.  Patient creatinine at 1.14 with a BUN of 13 and GFR at 69.  Anion gap of 15 with a serum bicarb of 29 and serum chloride of 98.  LFTs are unremarkable.  Lipase level at 6.  Leukocytosis noted with white blood cell count of 14.9.  Patient with chronic iron deficiency anemia hemoglobin at 12.6 with hematocrit of 42.5.  Patient platelet count at 221.  EKG showing normal sinus rhythm with PAC.  QT prolonged with a QTc of 514 ms.    Past Medical History  COPD   Chronic respiratory failure  Oxygen dependent.  Utilizes 3 L/min with activity.  1-1/2 L/min during sleep.  Otherwise utilizes oxygen as needed at rest.  S/p esophagectomy and right lung resection  H/o esophageal stricture  H/O CVA  DM2  Mixed HLD  IBS  MDD  H/o Viral Hep C  Vitamin D deficiency  CHI   H/o substance use   H/o PACs  Former tobacco smoker      Surgical History  Esophagectomy  Right lung resection     Social History  Social History     Socioeconomic History    Marital status:      Spouse name: Not on file    Number of children: Not on file    Years of education: Not on file    Highest education level: Not on file   Occupational History    Not on file   Tobacco Use    Smoking status: Former     Types: Cigarettes    Smokeless tobacco: Never   Vaping Use    Vaping status: Never Used   Substance and Sexual Activity    Alcohol use: Not Currently    Drug use: Not Currently    Sexual activity: Defer   Other Topics Concern    Not on file   Social History Narrative    Not on file     Social Determinants of Health     Financial Resource Strain: Not on file   Food Insecurity: Not on file   Transportation Needs: Not on file   Physical Activity: Not on file   Stress: Not on file   Social Connections: Not on file   Intimate Partner Violence: Not on file   Housing Stability: Not on file        Family History  Reviewed and not pertinent to patient presentation     Allergies  No Known Allergies       Physical Exam  Physical Exam  Vitals reviewed.   Constitutional:       Appearance: Normal appearance. He is normal weight.   HENT:      Head: Normocephalic and atraumatic.      Mouth/Throat:      Mouth: Mucous membranes are moist.   Eyes:      Extraocular Movements: Extraocular movements intact.      Pupils: Pupils are equal, round, and reactive to light.   Cardiovascular:      Rate and Rhythm: Normal rate and regular rhythm.      Pulses: Normal pulses.      Heart sounds: Normal heart sounds. No murmur heard.     No gallop.   Pulmonary:      Effort: Pulmonary effort is normal. No respiratory distress.      Breath sounds: Wheezing present. No rhonchi or rales.      Comments: S/p esophagectomy. Utilizes talk box for conversation. On 3 LPM with activity. 1.5 LPM at HS. Otherwise utilizes O2 prn at rest for SOB.   Abdominal:      General: Abdomen is flat. Bowel sounds  are normal. There is no distension.      Palpations: Abdomen is soft. There is no mass.      Tenderness: There is no abdominal tenderness. There is no rebound.      Hernia: No hernia is present.   Musculoskeletal:         General: No swelling, tenderness or signs of injury. Normal range of motion.      Cervical back: Normal range of motion and neck supple.      Right lower leg: No edema.      Left lower leg: No edema.   Skin:     General: Skin is warm and dry.      Capillary Refill: Capillary refill takes less than 2 seconds.      Findings: No bruising, erythema or rash.   Neurological:      General: No focal deficit present.      Mental Status: He is alert and oriented to person, place, and time.      Cranial Nerves: No cranial nerve deficit.      Sensory: No sensory deficit.      Motor: No weakness.   Psychiatric:         Mood and Affect: Mood normal.         Behavior: Behavior normal.          Last Recorded Vitals  Visit Vitals  BP (!) 149/92   Pulse 75   Temp 36.6 °C (97.9 °F) (Temporal)   Resp 18        Scheduled medications     Continuous medications     PRN medications  PRN medications: oxygen     Relevant Results  Results for orders placed or performed during the hospital encounter of 10/01/24 (from the past 96 hour(s))   Urinalysis with Reflex Culture and Microscopic   Result Value Ref Range    Color, Urine Light-Yellow Light-Yellow, Yellow, Dark-Yellow    Appearance, Urine Turbid (N) Clear    Specific Gravity, Urine 1.018 1.005 - 1.035    pH, Urine 7.0 5.0, 5.5, 6.0, 6.5, 7.0, 7.5, 8.0    Protein, Urine 20 (TRACE) NEGATIVE, 10 (TRACE), 20 (TRACE) mg/dL    Glucose, Urine Normal Normal mg/dL    Blood, Urine 1.0 (3+) (A) NEGATIVE    Ketones, Urine 10 (1+) (A) NEGATIVE mg/dL    Bilirubin, Urine NEGATIVE NEGATIVE    Urobilinogen, Urine Normal Normal mg/dL    Nitrite, Urine NEGATIVE NEGATIVE    Leukocyte Esterase, Urine 500 Van/µL (A) NEGATIVE   Microscopic Only, Urine   Result Value Ref Range    WBC, Urine >50  (A) 1-5, NONE /HPF    RBC, Urine >20 (A) NONE, 1-2, 3-5 /HPF    Bacteria, Urine 1+ (A) NONE SEEN /HPF    Budding Yeast, Urine PRESENT (A) NONE /HPF   CBC and Auto Differential   Result Value Ref Range    WBC 14.9 (H) 4.4 - 11.3 x10*3/uL    nRBC 0.0 0.0 - 0.0 /100 WBCs    RBC 4.84 4.50 - 5.90 x10*6/uL    Hemoglobin 12.6 (L) 13.5 - 17.5 g/dL    Hematocrit 42.5 41.0 - 52.0 %    MCV 88 80 - 100 fL    MCH 26.0 26.0 - 34.0 pg    MCHC 29.6 (L) 32.0 - 36.0 g/dL    RDW 13.6 11.5 - 14.5 %    Platelets 221 150 - 450 x10*3/uL    Neutrophils % 87.7 40.0 - 80.0 %    Immature Granulocytes %, Automated 0.5 0.0 - 0.9 %    Lymphocytes % 6.4 13.0 - 44.0 %    Monocytes % 5.0 2.0 - 10.0 %    Eosinophils % 0.1 0.0 - 6.0 %    Basophils % 0.3 0.0 - 2.0 %    Neutrophils Absolute 13.10 (H) 1.20 - 7.70 x10*3/uL    Immature Granulocytes Absolute, Automated 0.08 0.00 - 0.70 x10*3/uL    Lymphocytes Absolute 0.96 (L) 1.20 - 4.80 x10*3/uL    Monocytes Absolute 0.75 0.10 - 1.00 x10*3/uL    Eosinophils Absolute 0.01 0.00 - 0.70 x10*3/uL    Basophils Absolute 0.04 0.00 - 0.10 x10*3/uL   Comprehensive metabolic panel   Result Value Ref Range    Glucose 149 (H) 74 - 99 mg/dL    Sodium 138 136 - 145 mmol/L    Potassium 3.8 3.5 - 5.3 mmol/L    Chloride 98 98 - 107 mmol/L    Bicarbonate 29 21 - 32 mmol/L    Anion Gap 15 10 - 20 mmol/L    Urea Nitrogen 13 6 - 23 mg/dL    Creatinine 1.14 0.50 - 1.30 mg/dL    eGFR 69 >60 mL/min/1.73m*2    Calcium 9.1 8.6 - 10.3 mg/dL    Albumin 4.3 3.4 - 5.0 g/dL    Alkaline Phosphatase 53 33 - 136 U/L    Total Protein 6.8 6.4 - 8.2 g/dL    AST 19 9 - 39 U/L    Bilirubin, Total 1.0 0.0 - 1.2 mg/dL    ALT 11 10 - 52 U/L   Lipase   Result Value Ref Range    Lipase 6 (L) 9 - 82 U/L   ECG 12 lead   Result Value Ref Range    Ventricular Rate 80 BPM    Atrial Rate 80 BPM    OK Interval 154 ms    QRS Duration 84 ms    QT Interval 446 ms    QTC Calculation(Bazett) 514 ms    P Axis 68 degrees    R Axis 45 degrees    T Axis 70 degrees     QRS Count 13 beats    Q Onset 222 ms    P Onset 145 ms    P Offset 193 ms    T Offset 445 ms    QTC Fredericia 491 ms        CT abdomen pelvis w IV contrast    Result Date: 10/1/2024  Interpreted By:  Joan Corey, STUDY: CT ABDOMEN PELVIS W IV CONTRAST;  10/1/2024 10:33 am   INDICATION: Signs/Symptoms:L CVA tenderness, Suprapubic pain.   COMPARISON: None.   ACCESSION NUMBER(S): [id number]   ORDERING CLINICIAN: ALCON CHAU   TECHNIQUE: CT of the abdomen and pelvis was performed.  70 mL Omnipaque 350   FINDINGS: LOWER CHEST: Images of the lung bases show no infiltrate or pleural fluid. There is bibasilar scarring. There is a 0.5 cm nodule in the left lower lobe on series 4 axial image 27/100. In a patient with history of esophagectomy, this could represent a nonspecific nodule or metastatic nodule. Status post gastrectomy with gastric pull-through.   ABDOMEN:   LIVER: There is no hepatic mass.   BILE DUCTS: There is intrahepatic biliary dilatation. There is dilatation of the common hepatic and common bile duct. The common hepatic duct measures 1.7 cm. The common bile duct measures 1.3 cm. There is no pancreatic mass. There is no definite choledocholithiasis. The etiology of this is not known. This could represent a distal biliary stricture.   GALLBLADDER: The gallbladder is distended.   PANCREAS: There is dilatation of the pancreatic duct.   SPLEEN: The spleen is unremarkable. There is no splenic mass or splenomegaly.   ADRENAL GLANDS: The adrenal glands are unremarkable.   KIDNEYS AND URETERS: The kidneys function symmetrically. There are small bilateral simple renal. There is mild bilateral hydronephrosis and hydroureter to the bladder. This is likely dilated from bladder distention. There is haziness in the fat around the left kidney and proximal left ureter which could represent pyelo calyceal rupture from obstruction or pyelonephritis. There is no ureteral calculus. There is susceptibility of the mucosa  of the left pelvicaliceal system which could represent malakoplakia from chronic inflammation or irritation. The bladder is distended measuring 17.0 x 10.3 x 10.0 cm with a volume of 911 cc   BOWEL: There is no bowel wall thickening, dilatation or obstruction. There is a large amount of stool throughout the colon.   VESSELS: There is atherosclerotic calcification of the abdominal aorta.   PERITONEUM/RETROPERITONEUM/LYMPH NODES: There is no retroperitoneal or pelvic adenopathy.  There is no ascites.   ABDOMINAL WALL: The abdominal wall is unremarkable.   BONE AND SOFT TISSUE: There is no acute osseous finding.   The prostate gland is enlarged. This measures 5.7 x 4.9 cm.       1. Mild bilateral hydronephrosis and hydroureter to the level of the ascending urinary bladder. The dilatation is likely from bladder distention. The bladder volume is 911 cc. 2. There is haziness in the fat around the left kidney and left renal pelvis and proximal left ureter. This could represent pyelo calyceal rupture from obstruction versus pyelonephritis. There is susceptibility of the wall of the mucosa of the left pelvicalyceal system, likely malakoplakia from chronic irritation or obstruction. 3. Benign renal cysts. 4. Gallbladder distention with intrahepatic, common hepatic and common bile ductal dilatation. There is pancreatic dilatation. The etiology of this is not known. Consider distal pancreatic/duodenal stricture. 5. Constipation. 6. Status post esophagectomy. 7. Click 5 cm left lower lobe nodule.   MACRO: None   Signed by: Joan Corey 10/1/2024 11:37 AM Dictation workstation:   [id number]    ECG 12 lead    Result Date: 10/1/2024  Sinus rhythm with Premature atrial complexes Prolonged QT Abnormal ECG No previous ECGs available        Assessment and Plan    Principal Problem:    Acute urinary retention     Mild bilateral hydronephrosis with hydroureter  Concern for Pyelocalyceal rupture from obstruction versus pyelonephritis  on CT of pelvis.  Leukocytosis  HTN urgency   Constipation   QT prolongation on EKG  COPD   Chronic respiratory failure  Oxygen dependent.  Utilizes 3 L/min with activity.  1-1/2 L/min during sleep.  Otherwise utilizes oxygen as needed at rest.  S/p esophagectomy and right lung resection  H/o esophageal stricture  H/O CVA  DM2  Mixed HLD  IBS  MDD  H/o Viral Hep C  Vitamin D deficiency  CHI   H/o substance use   H/o PACs  Former tobacco smoker     PLAN  Admit patient  Labs, EKG, prior records and radiological studies reviewed and noted  Avoid QT prolonging medications with them patient's chronic medications  Monitor on telemetry  Monitor and replace electrolytes per protocol  [facility] otherwise we will continue treatment plan per patient presentation  Continue IV Rocephin  Continue indwelling urinary catheter for bladder drainage  Consult neurology for their evaluation and recommendations  Resume patient home medications as appropriate once med rec completed by nursing  Hold oral antidiabetic medications and resume at discharge.  Accu-Cheks before meals and at bedtime with SSI  Oxygen per protocol to maintain O2 saturations greater than 92%  Nebulizer treatments with RT  Further recommendations pending patient's clinical course and transfer status.    DVTP: Lovenox  CODE STATUS: Full code    A total of 69 minutes was spent on this visit     Plan of care was discussed extensively with patient. Patient verbalized understanding through teach back method. All questions and concerns addressed upon examination.     Of note, this documentation is completed using the Dragon Dictation system (voice recognition software). There may be spelling and/or grammatical errors that were not corrected prior to final submission

## 2024-10-01 NOTE — PROGRESS NOTES
Received message from the ED that [facility] wanted to speak with this worker regarding patient. Received call from [facility] and she was looking for updates on patient. Advised her this worker is not working directly with patient and there is limited information to view. Advised her this worker will reach out to physician and can call her back and let her know if they plan to admit.   Jasmin [phone]  Fax [phone]    1511  Per physician, they plan to admit. Left message for Jasmin with update and requested return call. Also faxed clinical information to fax provided.

## 2024-10-01 NOTE — ED NOTES
1704 Left voicemail message on Catarina phone to notify her what room the patient was admitted to   [phone]     Mirian Starks, EMT  10/01/24 1704

## 2024-10-01 NOTE — NURSING NOTE
1757: pt. Arrived to 3N from ED. Pt. Alert and oriented x3. Trach collar in place and connected to 3L o2 per ED report. Reed in place on arrival to floor. Pt. Reports no pain. Admission and vitals completed

## 2024-10-02 VITALS
BODY MASS INDEX: 17.92 KG/M2 | WEIGHT: 125.2 LBS | DIASTOLIC BLOOD PRESSURE: 63 MMHG | HEART RATE: 61 BPM | TEMPERATURE: 98.1 F | RESPIRATION RATE: 18 BRPM | HEIGHT: 70 IN | OXYGEN SATURATION: 100 % | SYSTOLIC BLOOD PRESSURE: 126 MMHG

## 2024-10-02 LAB
ANION GAP SERPL CALC-SCNC: 16 MMOL/L (ref 10–20)
BASOPHILS # BLD AUTO: 0.05 X10*3/UL (ref 0–0.1)
BASOPHILS NFR BLD AUTO: 0.5 %
BUN SERPL-MCNC: 16 MG/DL (ref 6–23)
CALCIUM SERPL-MCNC: 8.5 MG/DL (ref 8.6–10.3)
CHLORIDE SERPL-SCNC: 104 MMOL/L (ref 98–107)
CO2 SERPL-SCNC: 22 MMOL/L (ref 21–32)
CREAT SERPL-MCNC: 1.15 MG/DL (ref 0.5–1.3)
EGFRCR SERPLBLD CKD-EPI 2021: 68 ML/MIN/1.73M*2
EOSINOPHIL # BLD AUTO: 0.34 X10*3/UL (ref 0–0.7)
EOSINOPHIL NFR BLD AUTO: 3.3 %
ERYTHROCYTE [DISTWIDTH] IN BLOOD BY AUTOMATED COUNT: 14 % (ref 11.5–14.5)
GLUCOSE BLD MANUAL STRIP-MCNC: 137 MG/DL (ref 74–99)
GLUCOSE BLD MANUAL STRIP-MCNC: 81 MG/DL (ref 74–99)
GLUCOSE SERPL-MCNC: 80 MG/DL (ref 74–99)
HCT VFR BLD AUTO: 41.7 % (ref 41–52)
HGB BLD-MCNC: 12.1 G/DL (ref 13.5–17.5)
IMM GRANULOCYTES # BLD AUTO: 0.03 X10*3/UL (ref 0–0.7)
IMM GRANULOCYTES NFR BLD AUTO: 0.3 % (ref 0–0.9)
LYMPHOCYTES # BLD AUTO: 1.83 X10*3/UL (ref 1.2–4.8)
LYMPHOCYTES NFR BLD AUTO: 17.9 %
MAGNESIUM SERPL-MCNC: 1.97 MG/DL (ref 1.6–2.4)
MCH RBC QN AUTO: 26.4 PG (ref 26–34)
MCHC RBC AUTO-ENTMCNC: 29 G/DL (ref 32–36)
MCV RBC AUTO: 91 FL (ref 80–100)
MONOCYTES # BLD AUTO: 1.04 X10*3/UL (ref 0.1–1)
MONOCYTES NFR BLD AUTO: 10.2 %
NEUTROPHILS # BLD AUTO: 6.93 X10*3/UL (ref 1.2–7.7)
NEUTROPHILS NFR BLD AUTO: 67.8 %
NRBC BLD-RTO: 0 /100 WBCS (ref 0–0)
PLATELET # BLD AUTO: 191 X10*3/UL (ref 150–450)
POTASSIUM SERPL-SCNC: 3.7 MMOL/L (ref 3.5–5.3)
RBC # BLD AUTO: 4.59 X10*6/UL (ref 4.5–5.9)
SODIUM SERPL-SCNC: 138 MMOL/L (ref 136–145)
WBC # BLD AUTO: 10.2 X10*3/UL (ref 4.4–11.3)

## 2024-10-02 PROCEDURE — 2500000002 HC RX 250 W HCPCS SELF ADMINISTERED DRUGS (ALT 637 FOR MEDICARE OP, ALT 636 FOR OP/ED): Performed by: INTERNAL MEDICINE

## 2024-10-02 PROCEDURE — 83735 ASSAY OF MAGNESIUM: CPT | Performed by: INTERNAL MEDICINE

## 2024-10-02 PROCEDURE — 36415 COLL VENOUS BLD VENIPUNCTURE: CPT | Performed by: INTERNAL MEDICINE

## 2024-10-02 PROCEDURE — 97161 PT EVAL LOW COMPLEX 20 MIN: CPT | Mod: GP

## 2024-10-02 PROCEDURE — 97165 OT EVAL LOW COMPLEX 30 MIN: CPT | Mod: GO

## 2024-10-02 PROCEDURE — 2500000004 HC RX 250 GENERAL PHARMACY W/ HCPCS (ALT 636 FOR OP/ED): Performed by: INTERNAL MEDICINE

## 2024-10-02 PROCEDURE — 99222 1ST HOSP IP/OBS MODERATE 55: CPT | Performed by: UROLOGY

## 2024-10-02 PROCEDURE — 2500000001 HC RX 250 WO HCPCS SELF ADMINISTERED DRUGS (ALT 637 FOR MEDICARE OP): Performed by: INTERNAL MEDICINE

## 2024-10-02 PROCEDURE — 99239 HOSP IP/OBS DSCHRG MGMT >30: CPT | Performed by: INTERNAL MEDICINE

## 2024-10-02 PROCEDURE — 85025 COMPLETE CBC W/AUTO DIFF WBC: CPT | Performed by: INTERNAL MEDICINE

## 2024-10-02 PROCEDURE — G0378 HOSPITAL OBSERVATION PER HR: HCPCS

## 2024-10-02 PROCEDURE — 2500000002 HC RX 250 W HCPCS SELF ADMINISTERED DRUGS (ALT 637 FOR MEDICARE OP, ALT 636 FOR OP/ED): Mod: MUE | Performed by: NURSE PRACTITIONER

## 2024-10-02 PROCEDURE — 80048 BASIC METABOLIC PNL TOTAL CA: CPT | Performed by: INTERNAL MEDICINE

## 2024-10-02 PROCEDURE — 82947 ASSAY GLUCOSE BLOOD QUANT: CPT

## 2024-10-02 RX ORDER — TAMSULOSIN HYDROCHLORIDE 0.4 MG/1
0.4 CAPSULE ORAL DAILY
Qty: 30 CAPSULE | Refills: 0 | Status: SHIPPED | OUTPATIENT
Start: 2024-10-02 | End: 2024-11-01

## 2024-10-02 RX ORDER — IPRATROPIUM BROMIDE AND ALBUTEROL SULFATE 2.5; .5 MG/3ML; MG/3ML
3 SOLUTION RESPIRATORY (INHALATION) 3 TIMES DAILY
Status: DISCONTINUED | OUTPATIENT
Start: 2024-10-02 | End: 2024-10-02 | Stop reason: HOSPADM

## 2024-10-02 RX ORDER — CEFDINIR 300 MG/1
300 CAPSULE ORAL 2 TIMES DAILY
Qty: 14 CAPSULE | Refills: 0 | Status: SHIPPED | OUTPATIENT
Start: 2024-10-02 | End: 2024-10-09

## 2024-10-02 RX ORDER — TAMSULOSIN HYDROCHLORIDE 0.4 MG/1
0.4 CAPSULE ORAL DAILY
Status: DISCONTINUED | OUTPATIENT
Start: 2024-10-02 | End: 2024-10-02 | Stop reason: HOSPADM

## 2024-10-02 ASSESSMENT — COGNITIVE AND FUNCTIONAL STATUS - GENERAL
MOBILITY SCORE: 20
HELP NEEDED FOR BATHING: A LITTLE
DAILY ACTIVITIY SCORE: 21
WALKING IN HOSPITAL ROOM: A LITTLE
CLIMB 3 TO 5 STEPS WITH RAILING: A LITTLE
DAILY ACTIVITIY SCORE: 24
CLIMB 3 TO 5 STEPS WITH RAILING: A LITTLE
STANDING UP FROM CHAIR USING ARMS: A LITTLE
WALKING IN HOSPITAL ROOM: A LITTLE
MOVING TO AND FROM BED TO CHAIR: A LITTLE
DRESSING REGULAR LOWER BODY CLOTHING: A LITTLE
STANDING UP FROM CHAIR USING ARMS: A LITTLE
MOBILITY SCORE: 20
DRESSING REGULAR UPPER BODY CLOTHING: A LITTLE
MOVING TO AND FROM BED TO CHAIR: A LITTLE

## 2024-10-02 ASSESSMENT — ENCOUNTER SYMPTOMS
DIFFICULTY URINATING: 1
CHILLS: 0
WHEEZING: 0
SHORTNESS OF BREATH: 0
NEUROLOGICAL NEGATIVE: 1
HEMATURIA: 0
CONSTITUTIONAL NEGATIVE: 1
DIZZINESS: 0
CARDIOVASCULAR NEGATIVE: 1
RESPIRATORY NEGATIVE: 1
ABDOMINAL PAIN: 1
FEVER: 0
FREQUENCY: 0
PALPITATIONS: 0
NAUSEA: 0
EYES NEGATIVE: 1
ENDOCRINE NEGATIVE: 1
VOMITING: 0
DYSURIA: 1
FLANK PAIN: 1

## 2024-10-02 ASSESSMENT — PAIN - FUNCTIONAL ASSESSMENT
PAIN_FUNCTIONAL_ASSESSMENT: 0-10

## 2024-10-02 ASSESSMENT — PAIN SCALES - GENERAL
PAINLEVEL_OUTOF10: 0 - NO PAIN

## 2024-10-02 ASSESSMENT — ACTIVITIES OF DAILY LIVING (ADL): ADL_ASSISTANCE: INDEPENDENT

## 2024-10-02 NOTE — DISCHARGE SUMMARY
Discharge Diagnosis  Acute urinary retention    Issues Requiring Follow-Up  Keep the Reed catheter in and follow[facility]    Discharge Meds     Medication List      START taking these medications     cefdinir 300 mg capsule; Commonly known as: Omnicef; Take 1 capsule (300   mg) by mouth 2 times a day for 7 days.   tamsulosin 0.4 mg 24 hr capsule; Commonly known as: Flomax; Take 1   capsule (0.4 mg) by mouth once daily.     CONTINUE taking these medications     * albuterol 2.5 mg /3 mL (0.083 %) nebulizer solution   * Proventil HFA 90 mcg/actuation inhaler; Generic drug: albuterol   aspirin 81 mg EC tablet   atorvastatin 80 mg tablet; Commonly known as: Lipitor   Breztri Aerosphere 160-9-4.8 mcg/actuation HFA aerosol inhaler; Generic   drug: budesonide-glycopyr-formoterol   busPIRone 10 mg tablet; Commonly known as: Buspar   cetirizine 10 mg tablet; Commonly known as: ZyrTEC   diphenhydrAMINE 25 mg tablet; Commonly known as: Sominex   fluticasone 50 mcg/actuation nasal spray; Commonly known as: Flonase   omeprazole OTC 20 mg EC tablet; Commonly known as: PriLOSEC OTC  * This list has 2 medication(s) that are the same as other medications   prescribed for you. Read the directions carefully, and ask your doctor or   other care provider to review them with you.     STOP taking these medications     tolterodine LA 2 mg 24 hr capsule; Commonly known as: Detrol LA       Test Results Pending At Discharge  Pending Labs       Order Current Status    Urine Culture In process            Hospital Course  69-year-old male with past medical history of COPD, esophagectomy and right lung cancer with trach, CVA, type 2 diabetes mellitus, hyperlipidemia, IBS, hepatitis C, substance abuse presented to emergency department for difficulty urinating and left-sided flank pain.  [facility] and was found to have acute urinary retention.  Patient had Reed catheter placed and it drained over 800 cc.  There was concern  for UTI possibly pyelonephritis.  Patient had mild leukocytosis.  He was started on ceftriaxone and CT abdomen pelvis was obtained and urology was consulted.  Patient's pain has completely resolved after the Reed catheter and he has no signs and symptoms of UTI.  Patient is feeling remarkably well would like to go.  His urine culture is still not back we will discharge him once third-generation oral cephalosporin and have him follow-up with [facility] provider.  We are discontinuing his Detrol LA and starting him on Flomax daily.  He will need to follow-up with [facility]ist [facility] for voiding trial and urodynamic studies.  Patient's phone number is [phone] and he knows that if the urine culture comes back resistant we can give him a call and this is a number he can be reached at.    44 minutes spent in discharge timing    Pertinent Physical Exam At Time of Discharge  General: Not in acute distress, alert.  Thin, frail on trach collar  HEENT: PERRLA, head intact and normocephalic  Neck: Has a trach collar and collar.  Communicates using the voice assistive device  Lungs: Clear to auscultation, work of breathing within normal limit  Cardiac: Regular rate and rhythm  Abdomen: Soft nontender, positive bowel sounds  : Reed catheter in place  Skin: Intact  Hematology: No petechia or excessive ecchymosis  Musculoskeletal: Without significant trauma  Neurological: Alert awake oriented, no focal deficit, cranial nerves grossly intact  Psych: No suicidal ideation or homicidal ideation    Outpatient Follow-Up  No future appointments.      Hemal Siddiqui MD

## 2024-10-02 NOTE — CARE PLAN
The patient's goals for the shift include      The clinical goals for the shift include Patient will have adequate urine output throughout shift.

## 2024-10-02 NOTE — PROGRESS NOTES
10/02/24 1050   Discharge Planning   Expected Discharge Disposition Home     Patient confirms that he feels safe to return home at discharge. Nursing to complete blum catheter training with him and he states he can manage this on his own. He states he works and his boss will come pick him up from the hospital. Called and left voicemail for Jasmin [phone] at the [facility] to update with d/c plan.

## 2024-10-02 NOTE — NURSING NOTE
1145: Pt. Educated on blum care at home. Switching between leg bag and large blum bag for sleep. Pt, educated to keep bag below bladder level. Discharge instructions reviewed with patient at this time. Pt. Has no questions or concerns at this time and feels confident about changing blum bag and caring for blum. Equipment provided for discharge. Pt awaiting ride home from family     1222: Pt. Discharged home at this time. IV removed. Tele removed. All belongings taken with patient at this time. DC instructions reviewed with patient at this time with pt. Reporting no further questions at this time.  Blum intact and switched to leg bag for discharge. Pt. States he understands all blum care for discharge.

## 2024-10-02 NOTE — CONSULTS
Inpatient consult to Urology  Consult performed by: KALYANI eYh  Consult ordered by: KALYANI Townsend  Reason for consult: Pyelonephritis, acute urinary retention      History Of Present Illness  Steve Palm is a 69 y.o. male with PMHx of COPD, esophagectomy and right lung resection, CVA, DM type 2, HLD, IBS, Viral hepatitis C, substance abuse that presented to the emergency room with complaint of difficulty with urination and left sided flank pain. [facility]  Patient reports that over the past couple of days he has had suprapubic pain/pressure, difficulty urinating and left sided groin pain radiating around to his left flank. Patient does endorse some dysuria and urgency, denies frequency and hematuria.  He states that he has never had this issues in the past.  He states that when he did try to urinate, he would only dribble urine.  He states that he is on flomax for BPH.      At time of consult, vital signs stable.  Labs reported leukocytosis resolved, WBC 10.2, ws 14.9 in ED, H&H 12.1/41.7, platelets 191, rest of labs unremarkable.  UA reported budding yeast present, 1+ ketones, 3+ blood, 500 leukocyte esterase, 1+ bacteria, >20 RBC, >50 WBC.  Patient was bladder scanned for 758cc of urine with decision to place blum catheter and 800cc drained from bladder.        Past Medical History  CVA, COPD, Diabetes mellitus type II, BPH, esophageal stricture, GERD, hyperlipidemia, IBS, depression, urge incontinence of urine, viral hepatitis C, Vitamin D deficiency, ETOH and drug abuse,     Surgical History  Esophagectomy, right lung resection     Social History  Social Determinants of Health     Tobacco Use: Medium Risk (10/1/2024)    Patient History     Smoking Tobacco Use: Former     Smokeless Tobacco Use: Never     Passive Exposure: Not on file   Alcohol Use: Not At Risk (10/1/2024)    AUDIT-C     Frequency of Alcohol Consumption: Never     Average  Number of Drinks: Patient does not drink     Frequency of Binge Drinking: Never   Financial Resource Strain: Patient Declined (10/1/2024)    Overall Financial Resource Strain (CARDIA)     Difficulty of Paying Living Expenses: Patient declined   Food Insecurity: Patient Declined (10/1/2024)    Hunger Vital Sign     Worried About Running Out of Food in the Last Year: Patient declined     Ran Out of Food in the Last Year: Patient declined   Transportation Needs: No Transportation Needs (10/1/2024)    PRAPARE - Transportation     Lack of Transportation (Medical): No     Lack of Transportation (Non-Medical): No   Physical Activity: Not on file   Stress: Not on file   Social Connections: Not on file   Intimate Partner Violence: Not At Risk (10/1/2024)    Humiliation, Afraid, Rape, and Kick questionnaire     Fear of Current or Ex-Partner: No     Emotionally Abused: No     Physically Abused: No     Sexually Abused: No   Depression: Not at risk (10/1/2024)    PHQ-2     PHQ-2 Score: 0   Housing Stability: Unknown (10/1/2024)    Housing Stability Vital Sign     Unable to Pay for Housing in the Last Year: Patient declined     Number of Times Moved in the Last Year: 1     Homeless in the Last Year: No   Utilities: Patient Declined (10/1/2024)    [facility] Utilities     Threatened with loss of utilities: Patient declined   Digital Equity: Not on file   Health Literacy: Not on file        Family History  No family history on file.     Home Medications  Prior to Admission medications    Medication Sig Start Date End Date Taking? Authorizing Provider   albuterol (Proventil HFA) 90 mcg/actuation inhaler Inhale 2 puffs every 6 hours if needed for wheezing.   Yes Historical Provider, MD   aspirin 81 mg EC tablet Take 1 tablet (81 mg) by mouth once daily.   Yes Historical Provider, MD   atorvastatin (Lipitor) 80 mg tablet Take 1 tablet (80 mg) by mouth once daily.   Yes Historical Provider, MD   budesonide-glycopyr-formoterol (Breztri  Aerosphere) 160-9-4.8 mcg/actuation HFA aerosol inhaler Inhale 2 puffs 2 times a day.   Yes Historical Provider, MD   busPIRone (Buspar) 10 mg tablet Take 1 tablet (10 mg) by mouth 2 times a day.   Yes Historical Provider, MD   cetirizine (ZyrTEC) 10 mg tablet Take 1 tablet (10 mg) by mouth once daily.   Yes Historical Provider, MD   diphenhydrAMINE (Sominex) 25 mg tablet Take 1 tablet (25 mg) by mouth once daily.   Yes Historical Provider, MD   fluticasone (Flonase) 50 mcg/actuation nasal spray Administer 2 sprays into each nostril once daily. Shake gently. Before first use, prime pump. After use, clean tip and replace cap.   Yes Historical Provider, MD   omeprazole OTC (PriLOSEC OTC) 20 mg EC tablet Take 1 tablet (20 mg) by mouth 2 times a day. Do not crush, chew, or split.   Yes Historical Provider, MD   tolterodine LA (Detrol LA) 2 mg 24 hr capsule Take 1 capsule (2 mg) by mouth once daily. Do not crush, chew, or split.   Yes Historical Provider, MD   albuterol 2.5 mg /3 mL (0.083 %) nebulizer solution Take 3 mL (2.5 mg) by nebulization every 6 hours if needed for wheezing.    Historical Provider, MD        Allergies  Patient has no known allergies.    Review of Systems  Review of Systems   Constitutional: Negative.  Negative for chills and fever.   HENT: Negative.     Eyes: Negative.    Respiratory: Negative.  Negative for shortness of breath and wheezing.    Cardiovascular: Negative.  Negative for chest pain and palpitations.   Gastrointestinal:  Positive for abdominal pain. Negative for nausea and vomiting.   Endocrine: Negative.    Genitourinary:  Positive for decreased urine volume, difficulty urinating, dysuria, flank pain and urgency. Negative for frequency and hematuria.   Neurological: Negative.  Negative for dizziness.       Physical Exam  Physical Exam  Vitals reviewed.   Constitutional:       General: He is awake.      Appearance: Normal appearance.   Cardiovascular:      Rate and Rhythm: Normal rate  "and regular rhythm.      Pulses: Normal pulses.      Heart sounds: Normal heart sounds.   Pulmonary:      Effort: Pulmonary effort is normal.      Breath sounds: Normal breath sounds and air entry.   Abdominal:      General: Abdomen is flat. There is no distension.      Palpations: Abdomen is soft.      Tenderness: There is no abdominal tenderness. There is no right CVA tenderness or left CVA tenderness.   Genitourinary:     Comments: Reed catheter with yellow urine   Musculoskeletal:         General: Normal range of motion.      Cervical back: Normal range of motion.   Skin:     General: Skin is warm and dry.   Neurological:      General: No focal deficit present.      Mental Status: He is alert and oriented to person, place, and time.   Psychiatric:         Behavior: Behavior is cooperative.          Medications  Scheduled medications  aspirin, 81 mg, oral, Daily  atorvastatin, 80 mg, oral, Nightly  busPIRone, 10 mg, oral, BID  cefTRIAXone, 2 g, intravenous, q24h  cetirizine, 10 mg, oral, Daily  diphenhydrAMINE, 25 mg, oral, Daily  enoxaparin, 40 mg, subcutaneous, q24h  fluticasone, 2 spray, Each Nostril, Daily  insulin lispro, 0-5 Units, subcutaneous, Before meals & nightly  ipratropium-albuteroL, 3 mL, nebulization, TID  pantoprazole, 40 mg, oral, Daily before breakfast  tamsulosin, 0.4 mg, oral, Daily      Continuous medications     PRN medications  PRN medications: acetaminophen **OR** acetaminophen **OR** acetaminophen, albuterol, benzocaine-menthol, dextrose, dextrose, glucagon, glucagon, guaiFENesin, ondansetron **OR** ondansetron, oxygen, polyethylene glycol, promethazine **OR** promethazine     Last Recorded Vitals  Heart Rate:  [60-87]   Temp:  [36 °C (96.8 °F)-36.7 °C (98.1 °F)]   Resp:  [17-20]   BP: (112-205)/()   Height:  [177.8 cm (5' 10\")]   Weight:  [56.8 kg (125 lb 3.2 oz)]   SpO2:  [92 %-100 %]      Input/Output    Intake/Output Summary (Last 24 hours) at 10/2/2024 7560  Last data filed " at 10/2/2024 0908  Gross per 24 hour   Intake 1526.67 ml   Output 3300 ml   Net -1773.33 ml        Labs  Results for orders placed or performed during the hospital encounter of 10/01/24 (from the past 24 hour(s))   ECG 12 lead   Result Value Ref Range    Ventricular Rate 80 BPM    Atrial Rate 80 BPM    RI Interval 154 ms    QRS Duration 84 ms    QT Interval 446 ms    QTC Calculation(Bazett) 514 ms    P Axis 68 degrees    R Axis 45 degrees    T Axis 70 degrees    QRS Count 13 beats    Q Onset 222 ms    P Onset 145 ms    P Offset 193 ms    T Offset 445 ms    QTC Fredericia 491 ms   POCT GLUCOSE   Result Value Ref Range    POCT Glucose 93 74 - 99 mg/dL   POCT GLUCOSE   Result Value Ref Range    POCT Glucose 94 74 - 99 mg/dL   Basic metabolic panel   Result Value Ref Range    Glucose 80 74 - 99 mg/dL    Sodium 138 136 - 145 mmol/L    Potassium 3.7 3.5 - 5.3 mmol/L    Chloride 104 98 - 107 mmol/L    Bicarbonate 22 21 - 32 mmol/L    Anion Gap 16 10 - 20 mmol/L    Urea Nitrogen 16 6 - 23 mg/dL    Creatinine 1.15 0.50 - 1.30 mg/dL    eGFR 68 >60 mL/min/1.73m*2    Calcium 8.5 (L) 8.6 - 10.3 mg/dL   CBC and Auto Differential   Result Value Ref Range    WBC 10.2 4.4 - 11.3 x10*3/uL    nRBC 0.0 0.0 - 0.0 /100 WBCs    RBC 4.59 4.50 - 5.90 x10*6/uL    Hemoglobin 12.1 (L) 13.5 - 17.5 g/dL    Hematocrit 41.7 41.0 - 52.0 %    MCV 91 80 - 100 fL    MCH 26.4 26.0 - 34.0 pg    MCHC 29.0 (L) 32.0 - 36.0 g/dL    RDW 14.0 11.5 - 14.5 %    Platelets 191 150 - 450 x10*3/uL    Neutrophils % 67.8 40.0 - 80.0 %    Immature Granulocytes %, Automated 0.3 0.0 - 0.9 %    Lymphocytes % 17.9 13.0 - 44.0 %    Monocytes % 10.2 2.0 - 10.0 %    Eosinophils % 3.3 0.0 - 6.0 %    Basophils % 0.5 0.0 - 2.0 %    Neutrophils Absolute 6.93 1.20 - 7.70 x10*3/uL    Immature Granulocytes Absolute, Automated 0.03 0.00 - 0.70 x10*3/uL    Lymphocytes Absolute 1.83 1.20 - 4.80 x10*3/uL    Monocytes Absolute 1.04 (H) 0.10 - 1.00 x10*3/uL    Eosinophils Absolute 0.34  0.00 - 0.70 x10*3/uL    Basophils Absolute 0.05 0.00 - 0.10 x10*3/uL   Magnesium   Result Value Ref Range    Magnesium 1.97 1.60 - 2.40 mg/dL   POCT GLUCOSE   Result Value Ref Range    POCT Glucose 81 74 - 99 mg/dL        Imaging  CT abdomen pelvis w IV contrast    Result Date: 10/1/2024  Interpreted By:  Joan Corey, STUDY: CT ABDOMEN PELVIS W IV CONTRAST;  10/1/2024 10:33 am   INDICATION: Signs/Symptoms:L CVA tenderness, Suprapubic pain.   COMPARISON: None.   ACCESSION NUMBER(S): [id number]   ORDERING CLINICIAN: ALCON CHAU   TECHNIQUE: CT of the abdomen and pelvis was performed.  70 mL Omnipaque 350   FINDINGS: LOWER CHEST: Images of the lung bases show no infiltrate or pleural fluid. There is bibasilar scarring. There is a 0.5 cm nodule in the left lower lobe on series 4 axial image 27/100. In a patient with history of esophagectomy, this could represent a nonspecific nodule or metastatic nodule. Status post gastrectomy with gastric pull-through.   ABDOMEN:   LIVER: There is no hepatic mass.   BILE DUCTS: There is intrahepatic biliary dilatation. There is dilatation of the common hepatic and common bile duct. The common hepatic duct measures 1.7 cm. The common bile duct measures 1.3 cm. There is no pancreatic mass. There is no definite choledocholithiasis. The etiology of this is not known. This could represent a distal biliary stricture.   GALLBLADDER: The gallbladder is distended.   PANCREAS: There is dilatation of the pancreatic duct.   SPLEEN: The spleen is unremarkable. There is no splenic mass or splenomegaly.   ADRENAL GLANDS: The adrenal glands are unremarkable.   KIDNEYS AND URETERS: The kidneys function symmetrically. There are small bilateral simple renal. There is mild bilateral hydronephrosis and hydroureter to the bladder. This is likely dilated from bladder distention. There is haziness in the fat around the left kidney and proximal left ureter which could represent pyelo calyceal rupture  from obstruction or pyelonephritis. There is no ureteral calculus. There is susceptibility of the mucosa of the left pelvicaliceal system which could represent malakoplakia from chronic inflammation or irritation. The bladder is distended measuring 17.0 x 10.3 x 10.0 cm with a volume of 911 cc   BOWEL: There is no bowel wall thickening, dilatation or obstruction. There is a large amount of stool throughout the colon.   VESSELS: There is atherosclerotic calcification of the abdominal aorta.   PERITONEUM/RETROPERITONEUM/LYMPH NODES: There is no retroperitoneal or pelvic adenopathy.  There is no ascites.   ABDOMINAL WALL: The abdominal wall is unremarkable.   BONE AND SOFT TISSUE: There is no acute osseous finding.   The prostate gland is enlarged. This measures 5.7 x 4.9 cm.       1. Mild bilateral hydronephrosis and hydroureter to the level of the ascending urinary bladder. The dilatation is likely from bladder distention. The bladder volume is 911 cc. 2. There is haziness in the fat around the left kidney and left renal pelvis and proximal left ureter. This could represent pyelo calyceal rupture from obstruction versus pyelonephritis. There is susceptibility of the wall of the mucosa of the left pelvicalyceal system, likely malakoplakia from chronic irritation or obstruction. 3. Benign renal cysts. 4. Gallbladder distention with intrahepatic, common hepatic and common bile ductal dilatation. There is pancreatic dilatation. The etiology of this is not known. Consider distal pancreatic/duodenal stricture. 5. Constipation. 6. Status post esophagectomy. 7. Click 5 cm left lower lobe nodule.   MACRO: None   Signed by: Joan Corey 10/1/2024 11:37 AM Dictation workstation:   [id number]    ECG 12 lead    Result Date: 10/1/2024  Sinus rhythm with Premature atrial complexes Prolonged QT Abnormal ECG No previous ECGs available         Plan  Acute urinary retention      - Reed catheter inserted - will maintain on  discharge.   - Discontinue ditropan  - Start flomax 0.4mg daily  - Urine culture pending - narrow antibiotics according to sensitivity      Plan of care discussed and patient seen with Dr. Gracia and urology will sign off.  [facility] blum catheter.  Please call with any concerns.     ITA Yeh-CNP    I spent 45 minutes in the professional and overall care of this patient.    Attending addendum: Patient seen with Odalys Alarcon CNP.  Plan developed in conjunction.    Briefly, patient is a 69-year-old male who is currently admitted with acute urinary retention and hydronephrosis with concern for pyelonephritis.  Patient reports at baseline he has hesitancy, weak stream, straining, intermittency and takes oxybutynin at baseline.  Patient developed severe abdominal pressure, left-sided flank pain and urgency to void with inability to generate any urine output.  In the ER, Blum catheter was placed.  A CT scan from 10/1/2024 showed markedly distended bladder, bilateral hydronephrosis, left-sided forniceal rupture, 4.2 cm x 4.9 cm x 4.6 cm prostate.    A Blum catheter was placed and the patient experienced complete relief of his symptoms.    Plan:  -Stop oxybutynin  -Start tamsulosin 0.4 mg nightly  -Patient will be arranged for [facility], message sent to [facility] team today  -Recommend 7-day course of antibiotics for complicated UTI  -Please call with any further questions, okay for discharge from urologic perspective

## 2024-10-02 NOTE — HOSPITAL COURSE
69-year-old male with past medical history of COPD, esophagectomy and right lung cancer with trach, CVA, type 2 diabetes mellitus, hyperlipidemia, IBS, hepatitis C, substance abuse presented to emergency department for difficulty urinating and left-sided flank pain.  [facility] and was found to have acute urinary retention.  Patient had Reed catheter placed and it drained over 800 cc.  There was concern for UTI possibly pyelonephritis.  Patient had mild leukocytosis.  He was started on ceftriaxone and CT abdomen pelvis was obtained and urology was consulted.  Patient's pain has completely resolved after the Reed catheter and he has no signs and symptoms of UTI.  Patient is feeling remarkably well would like to go.  His urine culture is still not back we will discharge him once third-generation oral cephalosporin and have him follow-up with his [facility] provider.  We are discontinuing his Detrol LA and starting him on Flomax daily.  He will need to follow-up with [facility] for [facility] and urodynamic studies.  Patient's phone number is [phone] and he knows that if the urine culture comes back resistant we can give him a call and this is a number he can be reached at.    44 minutes spent in discharge timing

## 2024-10-02 NOTE — NURSING NOTE
ADOD: 10/2.   Destination: home with new blum. Nursing to provided blum teaching and leg bed prior to DC.  Transportation Provided by: Friend  Patient feels updated by provider(s) and involved in POC.   Patient has been advised to defer to [facility] for new medications and follow-up visits.   Patient is active with MyChart.  Patient's Pharmacy [facility] [place].  Appointments will be made by  patient .  Patient has been notified about a survey and call back is to be expected 1-2 weeks post discharge.   Notified patient that DC Navigator is available everyday throughout their stay if any assistance is needed.     *[facility] and blum removal per Urology

## 2024-10-02 NOTE — NURSING NOTE
Vitals stable no acute events during the night Trach to 30% humidified  O2 Sats 99%. Meds crushed with apple sauce tolerating well. Reed with good output. Bed alarms on . Call bell within reach.

## 2024-10-02 NOTE — PROGRESS NOTES
Occupational Therapy  Occupational Therapy    Evaluation    Patient Name: Steve Palm  MRN: [id number]  Today's Date: 10/2/2024  Time Calculation  Start Time: 1033  Stop Time: 1046  Time Calculation (min): 13 min  3021/3021-A    Assessment  IP OT Assessment  OT Assessment:  (Pt. has no skilled OT needs)  Evaluation/Treatment Tolerance: Patient tolerated treatment well  End of Session Communication: Bedside nurse  End of Session Patient Position: Up in chair    Plan:  No Skilled OT: No acute OT goals identified  OT Frequency: OT eval only  OT Discharge Recommendations: No OT needed after discharge, No further acute OT  OT - OK to Discharge: Yes (Next level of care when cleared by medical team)    Subjective   Per EMR: This is a 69-year-old male who [facility] who is presenting to the emergency department with multiple urinary symptoms.  Patient over the last 2 days says that he has been having suprapubic abdominal pain which radiates towards his left flank accompanied by dysuria, urinary urgency and frequency.  He denies any fevers, chills, nausea, emesis or diarrhea.  No lightheadedness, dizziness or syncope.  No chest pain or shortness of breath.  He has a history of frequent UTIs.  Indwelling urinary catheter was placed in the ER and ER provider did speak with  urology along with [facility].  [facility] is [facility] for [facility] management.  Transfer started in the ED.     Review of systems: 10 system were reviewed and were negative except what was mentioned in history of present illness     ED course: Vital signs show patient blood pressure at 149/92.  Heart rate at 75 bpm.  Patient oxygen saturations at 99% on supplemental O2 which is chronic in nature.  CMP shows a blood glucose of 149.  Sodium at 138 with potassium of 3.8.  Patient creatinine at 1.14 with a BUN of 13 and GFR at 69.  Anion gap of 15 with a serum bicarb of 29 and serum chloride of  98.  LFTs are unremarkable.  Lipase level at 6.  Leukocytosis noted with white blood cell count of 14.9.  Patient with chronic iron deficiency anemia hemoglobin at 12.6 with hematocrit of 42.5.  Patient platelet count at 221.  EKG showing normal sinus rhythm with PAC.  QT prolonged with a QTc of 514 ms.     Past Medical History  COPD   Chronic respiratory failure  Oxygen dependent.  Utilizes 3 L/min with activity.  1-1/2 L/min during sleep.  Otherwise utilizes oxygen as needed at rest.  S/p esophagectomy and right lung resection  H/o esophageal stricture  H/O CVA  Current Problem:  1. Acute urinary retention  tamsulosin (Flomax) 0.4 mg 24 hr capsule    cefdinir (Omnicef) 300 mg capsule    Referral to Primary Care - Family Practice          General:  General  Reason for Referral: ADLs, discharge planning  Referred By: Dr. Siddiqui  Family/Caregiver Present: No  Co-Treatment: PT  Co-Treatment Reason: Safety  Prior to Session Communication: Bedside nurse  Patient Position Received: Bed, 3 rail up, Alarm off, not on at start of session    Precautions:  Medical Precautions: Fall precautions      Pain:  Pain Assessment  Pain Assessment: 0-10  0-10 (Numeric) Pain Score: 0 - No pain    Objective     Cognition:  Overall Cognitive Status: Within Functional Limits  Safety/Judgement: Within Functional Limits             Home Living:  Home Living Comments:  (Pt. lives alone in 2nd floor apt with 2 flights of stairs. Has walk in shower . PLOF independent, drives, works at [employer])     Prior Function:  Level of [place]: Independent with ADLs and functional transfers  ADL Assistance: Independent  Homemaking Assistance: Independent  Ambulatory Assistance: Independent         ADL:  Grooming Assistance: Modified independent (Device)  LE Dressing Assistance: Independent    Activity Tolerance:  Endurance: Endurance does not limit participation in activity    Bed Mobility/Transfers:   Bed Mobility  Bed Mobility:  (supine to sit  independent)       Ambulation/Gait Training:  Functional Mobility  Functional Mobility Performed:  (Pt. completes functional mobility without AD MOD I)    Sitting Balance:  Static Sitting Balance  Static Sitting-Balance Support: No upper extremity supported  Static Sitting-Level of Assistance: Independent    Standing Balance:  Static Standing Balance  Static Standing-Balance Support: No upper extremity supported  Static Standing-Level of Assistance: Modified Independent      Sensation:  Sensation Comment: Denies numbness        Hand Function:  Hand Function  Gross Grasp: Functional  Coordination: Functional    Extremities: RUE   RUE : Within Functional Limits and LUE   LUE: Within Functional Limits    Outcome Measures: [facility] Daily Activity  Putting on and taking off regular lower body clothing: None  Bathing (including washing, rinsing, drying): None  Putting on and taking off regular upper body clothing: None  Toileting, which includes using toilet, bedpan or urinal: None  Taking care of personal grooming such as brushing teeth: None  Eating Meals: None  Daily Activity - Total Score: 24             EDUCATION:  Education  Individual(s) Educated: Patient  Education Provided: Fall precautons, Risk and benefits of OT discussed with patient or other  Patient Response to Education: Patient/Caregiver Verbalized Understanding of Information        Goals: NA

## 2024-10-02 NOTE — PROGRESS NOTES
Physical Therapy    Physical Therapy Evaluation and Discharge    Patient Name: Steve Palm  MRN: [id number]  Department: [address]  Room: [facility]  Today's Date: 10/2/2024   Time Calculation  Start Time: 1033  Stop Time: 1046  Time Calculation (min): 13 min    Assessment/Plan   PT Assessment  Assessment Comment: Pt appears at baseline for mobility, currently mobilizing without AD at SBAx1. No acute care PT needs identified. No PT or DME needs. Will dc from PT caseload. Please re-order if there is a change in mobility status or as appropriate.  IP OR SWING BED PT PLAN  Inpatient or Swing Bed: Inpatient  PT Plan  PT Plan: PT Eval only  PT Eval Only Reason: At baseline function  PT Frequency: PT eval only  PT Discharge Recommendations: No further acute PT, No PT needed after discharge  PT Recommended Transfer Status: Stand by assist  PT - OK to Discharge: Yes (Pt ok to discharge from acute care PT to next level of care once cleared by medical team.)      Subjective   General Visit Information:  General  Reason for Referral: 68yo M currently being treated for acute urinary retention, now with blum.  Referred By: Hemal Siddiqui MD  Past Medical History Relevant to Rehab: History reviewed. No pertinent past medical history.    Family/Caregiver Present: No  Co-Treatment: OT  Co-Treatment Reason: dc plan  Prior to Session Communication: Bedside nurse  Patient Position Received: Bed, 3 rail up, Alarm on  General Comment: Pt is pleasant and agreeable to PT assessment.  Home Living:  Home Living  Type of Home: Apartment (2nd floor)  Lives With: Alone  Home Access:  (Stair access to 2nd floor apartment with hand rail)  Bathroom Shower/Tub: Tub/shower unit  Prior Level of Function:  Prior Function Per Pt/Caregiver Report  Prior Function Comments: IND without AD for gait, IND with ADLs/IADLs, drives, works at [employer]. Is on supp O2 mask to trach at baseline.  Precautions:  Precautions  Medical Precautions: Fall  precautions  Precautions Comment: Pt on supp O2 mask to trach today on 8L. At baseline 3L.    Objective   Pain:  Pain Assessment  Pain Assessment: 0-10  0-10 (Numeric) Pain Score: 0 - No pain  Cognition:  Cognition  Overall Cognitive Status: Within Functional Limits (AOx4.)    General Assessments:  Sensation  Light Touch: No apparent deficits    Static Sitting Balance  Static Sitting-Comment/Number of Minutes: IND  Dynamic Sitting Balance  Dynamic Sitting-Comments: IND to scoot    Static Standing Balance  Static Standing-Comment/Number of Minutes: SBA without AD  Dynamic Standing Balance  Dynamic Standing-Comments: SBA without AD to amb bed to chair and march in place without UE support. Pt is steady, no LOB. Pt denies any change in balance.  Functional Assessments:  Bed Mobility  Bed Mobility: Yes  Bed Mobility 1  Bed Mobility 1: Supine to sitting  Level of Assistance 1: Modified independent    Transfers  Transfer: Yes  Transfer 1  Transfer From 1: Bed to  Transfer to 1: Chair with arms  Trials/Comments 1: SBA without AD or UE support. Steady, no LOB. Pt amb bed to chair 1x4'. Gait distance limited as pt is on supp O2 mask to trach connected to wall.       Extremity/Trunk Assessments:  RLE   RLE : Within Functional Limits (Grossly 5/5 BLE)  LLE   LLE : Within Functional Limits  Outcome Measures:  [facility] Basic Mobility  Turning from your back to your side while in a flat bed without using bedrails: None  Moving from lying on your back to sitting on the side of a flat bed without using bedrails: None  Moving to and from bed to chair (including a wheelchair): A little  Standing up from a chair using your arms (e.g. wheelchair or bedside chair): A little  To walk in hospital room: A little  Climbing 3-5 steps with railing: A little  Basic Mobility - Total Score: 20    Encounter Problems       Encounter Problems (Active)       Pain - Adult              Education Documentation  Mobility Training, taught by Kaitlynn BUTCHER  Freddie PT at 10/2/2024 11:34 AM.  Learner: Patient  Readiness: Acceptance  Method: Explanation  Response: Verbalizes Understanding, Demonstrated Understanding    Education Comments  No comments found.

## 2024-10-03 LAB — BACTERIA UR CULT: ABNORMAL

## 2024-10-04 LAB
ATRIAL RATE: 80 BPM
P AXIS: 68 DEGREES
P OFFSET: 193 MS
P ONSET: 145 MS
PR INTERVAL: 154 MS
Q ONSET: 222 MS
QRS COUNT: 13 BEATS
QRS DURATION: 84 MS
QT INTERVAL: 446 MS
QTC CALCULATION(BAZETT): 514 MS
QTC FREDERICIA: 491 MS
R AXIS: 45 DEGREES
T AXIS: 70 DEGREES
T OFFSET: 445 MS
VENTRICULAR RATE: 80 BPM